# Patient Record
Sex: FEMALE | Race: ASIAN | Employment: PART TIME | URBAN - METROPOLITAN AREA
[De-identification: names, ages, dates, MRNs, and addresses within clinical notes are randomized per-mention and may not be internally consistent; named-entity substitution may affect disease eponyms.]

---

## 2020-01-17 PROBLEM — F41.1 GAD (GENERALIZED ANXIETY DISORDER): Status: ACTIVE | Noted: 2020-01-17

## 2020-01-17 PROBLEM — F50.81 BINGE EATING DISORDER: Status: ACTIVE | Noted: 2020-01-17

## 2020-01-17 PROBLEM — F33.41 RECURRENT MAJOR DEPRESSIVE DISORDER, IN PARTIAL REMISSION: Status: ACTIVE | Noted: 2020-01-17

## 2020-01-17 PROBLEM — F50.819 BINGE EATING DISORDER: Status: ACTIVE | Noted: 2020-01-17

## 2020-01-17 PROBLEM — F33.41 RECURRENT MAJOR DEPRESSIVE DISORDER, IN PARTIAL REMISSION (HCC): Status: ACTIVE | Noted: 2020-01-17

## 2021-03-20 ENCOUNTER — HOSPITAL ENCOUNTER (EMERGENCY)
Facility: HOSPITAL | Age: 26
Discharge: HOME | End: 2021-03-20
Attending: EMERGENCY MEDICINE
Payer: COMMERCIAL

## 2021-03-20 ENCOUNTER — APPOINTMENT (OUTPATIENT)
Dept: CT IMAGING | Facility: HOSPITAL | Age: 26
End: 2021-03-20
Attending: EMERGENCY MEDICINE
Payer: COMMERCIAL

## 2021-03-20 VITALS
RESPIRATION RATE: 17 BRPM | WEIGHT: 200 LBS | OXYGEN SATURATION: 97 % | SYSTOLIC BLOOD PRESSURE: 122 MMHG | HEIGHT: 64 IN | TEMPERATURE: 98 F | DIASTOLIC BLOOD PRESSURE: 61 MMHG | BODY MASS INDEX: 34.15 KG/M2 | HEART RATE: 91 BPM

## 2021-03-20 DIAGNOSIS — R35.0 URINARY FREQUENCY: Primary | ICD-10-CM

## 2021-03-20 LAB
ALBUMIN SERPL-MCNC: 3.2 G/DL (ref 3.4–5)
ALBUMIN/GLOB SERPL: 0.9 {RATIO} (ref 1–2)
ALP LIVER SERPL-CCNC: 65 U/L
ALT SERPL-CCNC: 23 U/L
ANION GAP SERPL CALC-SCNC: 4 MMOL/L (ref 0–18)
AST SERPL-CCNC: 12 U/L (ref 15–37)
BASOPHILS # BLD AUTO: 0.04 X10(3) UL (ref 0–0.2)
BASOPHILS NFR BLD AUTO: 0.5 %
BILIRUB SERPL-MCNC: 0.1 MG/DL (ref 0.1–2)
BILIRUB UR QL STRIP.AUTO: NEGATIVE
BUN BLD-MCNC: 11 MG/DL (ref 7–18)
BUN/CREAT SERPL: 11.5 (ref 10–20)
CALCIUM BLD-MCNC: 8.3 MG/DL (ref 8.5–10.1)
CHLORIDE SERPL-SCNC: 111 MMOL/L (ref 98–112)
CLARITY UR REFRACT.AUTO: CLEAR
CO2 SERPL-SCNC: 25 MMOL/L (ref 21–32)
CREAT BLD-MCNC: 0.96 MG/DL
DEPRECATED RDW RBC AUTO: 43.6 FL (ref 35.1–46.3)
EOSINOPHIL # BLD AUTO: 0.25 X10(3) UL (ref 0–0.7)
EOSINOPHIL NFR BLD AUTO: 3.1 %
ERYTHROCYTE [DISTWIDTH] IN BLOOD BY AUTOMATED COUNT: 12.7 % (ref 11–15)
ETHANOL SERPL-MCNC: <3 MG/DL (ref ?–3)
GLOBULIN PLAS-MCNC: 3.4 G/DL (ref 2.8–4.4)
GLUCOSE BLD-MCNC: 89 MG/DL (ref 70–99)
GLUCOSE UR STRIP.AUTO-MCNC: NEGATIVE MG/DL
HCT VFR BLD AUTO: 40.1 %
HGB BLD-MCNC: 13.5 G/DL
IMM GRANULOCYTES # BLD AUTO: 0.02 X10(3) UL (ref 0–1)
IMM GRANULOCYTES NFR BLD: 0.3 %
KETONES UR STRIP.AUTO-MCNC: NEGATIVE MG/DL
LEUKOCYTE ESTERASE UR QL STRIP.AUTO: NEGATIVE
LYMPHOCYTES # BLD AUTO: 3.34 X10(3) UL (ref 1–4)
LYMPHOCYTES NFR BLD AUTO: 41.9 %
M PROTEIN MFR SERPL ELPH: 6.6 G/DL (ref 6.4–8.2)
MCH RBC QN AUTO: 31.8 PG (ref 26–34)
MCHC RBC AUTO-ENTMCNC: 33.7 G/DL (ref 31–37)
MCV RBC AUTO: 94.6 FL
MONOCYTES # BLD AUTO: 0.84 X10(3) UL (ref 0.1–1)
MONOCYTES NFR BLD AUTO: 10.5 %
NEUTROPHILS # BLD AUTO: 3.49 X10 (3) UL (ref 1.5–7.7)
NEUTROPHILS # BLD AUTO: 3.49 X10(3) UL (ref 1.5–7.7)
NEUTROPHILS NFR BLD AUTO: 43.7 %
NITRITE UR QL STRIP.AUTO: NEGATIVE
OSMOLALITY SERPL CALC.SUM OF ELEC: 289 MOSM/KG (ref 275–295)
PH UR STRIP.AUTO: 8 [PH] (ref 5–8)
PLATELET # BLD AUTO: 323 10(3)UL (ref 150–450)
POCT URINE PREGNANCY: NEGATIVE
POTASSIUM SERPL-SCNC: 4.2 MMOL/L (ref 3.5–5.1)
PROT UR STRIP.AUTO-MCNC: NEGATIVE MG/DL
RBC # BLD AUTO: 4.24 X10(6)UL
RBC UR QL AUTO: NEGATIVE
SODIUM SERPL-SCNC: 140 MMOL/L (ref 136–145)
SP GR UR STRIP.AUTO: 1.01 (ref 1–1.03)
UROBILINOGEN UR STRIP.AUTO-MCNC: <2 MG/DL
WBC # BLD AUTO: 8 X10(3) UL (ref 4–11)

## 2021-03-20 PROCEDURE — 36415 COLL VENOUS BLD VENIPUNCTURE: CPT

## 2021-03-20 PROCEDURE — 81003 URINALYSIS AUTO W/O SCOPE: CPT | Performed by: EMERGENCY MEDICINE

## 2021-03-20 PROCEDURE — 85025 COMPLETE CBC W/AUTO DIFF WBC: CPT | Performed by: EMERGENCY MEDICINE

## 2021-03-20 PROCEDURE — 81025 URINE PREGNANCY TEST: CPT

## 2021-03-20 PROCEDURE — 74176 CT ABD & PELVIS W/O CONTRAST: CPT | Performed by: EMERGENCY MEDICINE

## 2021-03-20 PROCEDURE — 99284 EMERGENCY DEPT VISIT MOD MDM: CPT

## 2021-03-20 PROCEDURE — 80053 COMPREHEN METABOLIC PANEL: CPT | Performed by: EMERGENCY MEDICINE

## 2021-03-20 PROCEDURE — 82077 ASSAY SPEC XCP UR&BREATH IA: CPT | Performed by: EMERGENCY MEDICINE

## 2021-03-20 NOTE — ED NOTES
Patient discharged to home with follow-up with PCP and urology. Patient AOx3 with no signs of acute distress.

## 2021-03-20 NOTE — ED PROVIDER NOTES
Patient Seen in: [facility] Emergency Department      History   Patient presents with:  Flank Pain  Nausea/Vomiting/Diarrhea    Stated Complaint: Bilateral Flank Pain; Vomited 1x last night    HPI/Subjective:   HPI    27-year-old female presents rep 17   Ht 162.6 cm (5' 4\")   Wt 90.7 kg   LMP 02/28/2021   SpO2 97%   Breastfeeding No   BMI 34.33 kg/m²         Physical Exam    General:  Vitals as listed. No acute distress   HEENT: Sclerae anicteric. Conjunctivae show no pallor.   Oropharynx clear, muc Alejandrina Burden MD from [facility]         [facility]      21-year-old female presents reporting recent urinalysis showing microscopic hematuria with out evidence of infection. However, she was started on Keflex. Urine at the bedside is light yellow and clear.   [facility] [place]

## 2021-03-20 NOTE — ED INITIAL ASSESSMENT (HPI)
Pt states started peeing blood Tuesday. Pt states she was seen at immediate care. Pt was started on abx. Pt c/o pain to right flank and upper back pain.

## 2024-02-29 ENCOUNTER — HOSPITAL ENCOUNTER (EMERGENCY)
Facility: HOSPITAL | Age: 29
Discharge: HOME | End: 2024-02-29
Attending: EMERGENCY MEDICINE
Payer: MEDICAID

## 2024-02-29 ENCOUNTER — APPOINTMENT (OUTPATIENT)
Dept: ULTRASOUND IMAGING | Facility: HOSPITAL | Age: 29
End: 2024-02-29
Attending: EMERGENCY MEDICINE
Payer: MEDICAID

## 2024-02-29 ENCOUNTER — APPOINTMENT (OUTPATIENT)
Dept: CT IMAGING | Facility: HOSPITAL | Age: 29
End: 2024-02-29
Attending: EMERGENCY MEDICINE
Payer: MEDICAID

## 2024-02-29 VITALS
DIASTOLIC BLOOD PRESSURE: 76 MMHG | WEIGHT: 267 LBS | SYSTOLIC BLOOD PRESSURE: 129 MMHG | TEMPERATURE: 97 F | HEART RATE: 84 BPM | RESPIRATION RATE: 14 BRPM | OXYGEN SATURATION: 98 % | BODY MASS INDEX: 45.58 KG/M2 | HEIGHT: 64 IN

## 2024-02-29 DIAGNOSIS — N83.201 RIGHT OVARIAN CYST: Primary | ICD-10-CM

## 2024-02-29 LAB
ALBUMIN SERPL-MCNC: 3.5 G/DL (ref 3.4–5)
ALBUMIN/GLOB SERPL: 0.9 {RATIO} (ref 1–2)
ALP LIVER SERPL-CCNC: 86 U/L
ALT SERPL-CCNC: 21 U/L
ANION GAP SERPL CALC-SCNC: 2 MMOL/L (ref 0–18)
AST SERPL-CCNC: 23 U/L (ref 15–37)
B-HCG UR QL: NEGATIVE
BASOPHILS # BLD AUTO: 0.04 X10(3) UL (ref 0–0.2)
BASOPHILS NFR BLD AUTO: 0.3 %
BILIRUB SERPL-MCNC: 0.4 MG/DL (ref 0.1–2)
BILIRUB UR QL STRIP.AUTO: NEGATIVE
BUN BLD-MCNC: 14 MG/DL (ref 9–23)
CALCIUM BLD-MCNC: 9 MG/DL (ref 8.5–10.1)
CHLORIDE SERPL-SCNC: 111 MMOL/L (ref 98–112)
CLARITY UR REFRACT.AUTO: CLEAR
CO2 SERPL-SCNC: 23 MMOL/L (ref 21–32)
CREAT BLD-MCNC: 0.87 MG/DL
EGFRCR SERPLBLD CKD-EPI 2021: 94 ML/MIN/1.73M2 (ref 60–?)
EOSINOPHIL # BLD AUTO: 0.2 X10(3) UL (ref 0–0.7)
EOSINOPHIL NFR BLD AUTO: 1.6 %
ERYTHROCYTE [DISTWIDTH] IN BLOOD BY AUTOMATED COUNT: 13.4 %
GLOBULIN PLAS-MCNC: 3.7 G/DL (ref 2.8–4.4)
GLUCOSE BLD-MCNC: 116 MG/DL (ref 70–99)
GLUCOSE UR STRIP.AUTO-MCNC: NORMAL MG/DL
HCT VFR BLD AUTO: 43.6 %
HGB BLD-MCNC: 15 G/DL
IMM GRANULOCYTES # BLD AUTO: 0.08 X10(3) UL (ref 0–1)
IMM GRANULOCYTES NFR BLD: 0.6 %
LEUKOCYTE ESTERASE UR QL STRIP.AUTO: NEGATIVE
LIPASE SERPL-CCNC: 29 U/L (ref 13–75)
LYMPHOCYTES # BLD AUTO: 2.26 X10(3) UL (ref 1–4)
LYMPHOCYTES NFR BLD AUTO: 18.4 %
MCH RBC QN AUTO: 32.3 PG (ref 26–34)
MCHC RBC AUTO-ENTMCNC: 34.4 G/DL (ref 31–37)
MCV RBC AUTO: 94 FL
MONOCYTES # BLD AUTO: 0.93 X10(3) UL (ref 0.1–1)
MONOCYTES NFR BLD AUTO: 7.6 %
NEUTROPHILS # BLD AUTO: 8.8 X10 (3) UL (ref 1.5–7.7)
NEUTROPHILS # BLD AUTO: 8.8 X10(3) UL (ref 1.5–7.7)
NEUTROPHILS NFR BLD AUTO: 71.5 %
NITRITE UR QL STRIP.AUTO: NEGATIVE
OSMOLALITY SERPL CALC.SUM OF ELEC: 283 MOSM/KG (ref 275–295)
PH UR STRIP.AUTO: 6.5 [PH] (ref 5–8)
PLATELET # BLD AUTO: 304 10(3)UL (ref 150–450)
POTASSIUM SERPL-SCNC: 4.2 MMOL/L (ref 3.5–5.1)
PROT SERPL-MCNC: 7.2 G/DL (ref 6.4–8.2)
RBC # BLD AUTO: 4.64 X10(6)UL
SODIUM SERPL-SCNC: 136 MMOL/L (ref 136–145)
SP GR UR STRIP.AUTO: 1.03 (ref 1–1.03)
UROBILINOGEN UR STRIP.AUTO-MCNC: NORMAL MG/DL
WBC # BLD AUTO: 12.3 X10(3) UL (ref 4–11)

## 2024-02-29 PROCEDURE — 81001 URINALYSIS AUTO W/SCOPE: CPT | Performed by: EMERGENCY MEDICINE

## 2024-02-29 PROCEDURE — 76856 US EXAM PELVIC COMPLETE: CPT | Performed by: EMERGENCY MEDICINE

## 2024-02-29 PROCEDURE — 83690 ASSAY OF LIPASE: CPT

## 2024-02-29 PROCEDURE — 81025 URINE PREGNANCY TEST: CPT

## 2024-02-29 PROCEDURE — 85025 COMPLETE CBC W/AUTO DIFF WBC: CPT

## 2024-02-29 PROCEDURE — 83690 ASSAY OF LIPASE: CPT | Performed by: EMERGENCY MEDICINE

## 2024-02-29 PROCEDURE — 85025 COMPLETE CBC W/AUTO DIFF WBC: CPT | Performed by: EMERGENCY MEDICINE

## 2024-02-29 PROCEDURE — 80053 COMPREHEN METABOLIC PANEL: CPT

## 2024-02-29 PROCEDURE — 96374 THER/PROPH/DIAG INJ IV PUSH: CPT

## 2024-02-29 PROCEDURE — 74177 CT ABD & PELVIS W/CONTRAST: CPT | Performed by: EMERGENCY MEDICINE

## 2024-02-29 PROCEDURE — 99285 EMERGENCY DEPT VISIT HI MDM: CPT

## 2024-02-29 PROCEDURE — 80053 COMPREHEN METABOLIC PANEL: CPT | Performed by: EMERGENCY MEDICINE

## 2024-02-29 PROCEDURE — 93975 VASCULAR STUDY: CPT | Performed by: EMERGENCY MEDICINE

## 2024-02-29 RX ORDER — KETOROLAC TROMETHAMINE 15 MG/ML
15 INJECTION, SOLUTION INTRAMUSCULAR; INTRAVENOUS ONCE
Status: COMPLETED | OUTPATIENT
Start: 2024-02-29 | End: 2024-02-29

## 2024-02-29 RX ORDER — NAPROXEN 500 MG/1
500 TABLET ORAL 2 TIMES DAILY PRN
Qty: 20 TABLET | Refills: 0 | Status: SHIPPED | OUTPATIENT
Start: 2024-02-29 | End: 2024-03-10

## 2024-02-29 NOTE — ED PROVIDER NOTES
Patient Seen in: [facility] Emergency Department      History     Chief Complaint   Patient presents with    Abdomen/Flank Pain     Stated Complaint: generalized abd pain    Subjective:   HPI    28-year-old female with past medical history as below presents with right-sided abdominal pain that started at around noon today associated with nausea.  Patient states pain started radiating to her mid abdomen and sometimes her back.  She reports nausea and states he spit up in her mouth without actually vomiting.  She states she has been having chronic diarrhea for the past 2 months with approximately 5-10 episodes daily that is sometimes mucousy but nonbloody.  She states she is scheduled to see GI doctor regarding the diarrhea.  She denies having similar pain previously.  Denies urinary symptoms.  Denies fever or chills.  Denies cough or cold symptoms.    Objective:   Past Medical History:   Diagnosis Date    Anxiety     Binge eating disorder     Depression               History reviewed. No pertinent surgical history.             Social History     Socioeconomic History    Marital status: Single   Tobacco Use    Smoking status: Former    Smokeless tobacco: Never   Vaping Use    Vaping Use: Never used   Substance and Sexual Activity    Alcohol use: Not Currently     Comment: occ    Drug use: Yes     Types: Cannabis    Sexual activity: Yes     Partners: Male     Comment: boyfriend only              Review of Systems    Positive for stated complaint: generalized abd pain  Other systems are as noted in HPI.  Constitutional and vital signs reviewed.      All other systems reviewed and negative except as noted above.    Physical Exam     ED Triage Vitals   BP 02/29/24 1426 114/74   Pulse 02/29/24 1426 86   Resp 02/29/24 1426 18   Temp 02/29/24 1426 97 °F (36.1 °C)   Temp src --    SpO2 02/29/24 1426 95 %   O2 Device 02/29/24 1730 None (Room air)       Current:/74   Pulse 96   Temp 97 °F (36.1 °C)   Resp 16    Ht 162.6 cm (5' 4\")   Wt 121.1 kg   SpO2 98%   BMI 45.83 kg/m²         Physical Exam  Vitals and nursing note reviewed.   Constitutional:       Appearance: She is well-developed.   HENT:      Head: Normocephalic and atraumatic.      Mouth/Throat:      Mouth: Mucous membranes are moist.   Eyes:      General: No scleral icterus.  Cardiovascular:      Rate and Rhythm: Normal rate and regular rhythm.   Pulmonary:      Effort: Pulmonary effort is normal.      Breath sounds: Normal breath sounds.   Abdominal:      General: Bowel sounds are normal. There is no distension.      Palpations: Abdomen is soft.      Tenderness: There is abdominal tenderness. There is no guarding or rebound.      Comments: Diffuse right-sided abdominal tenderness   Skin:     General: Skin is warm and dry.   Neurological:      General: No focal deficit present.      Mental Status: She is alert and oriented to person, place, and time.      Cranial Nerves: No cranial nerve deficit.      Motor: No weakness.   Psychiatric:         Mood and Affect: Mood normal.         Behavior: Behavior normal.               ED Course     Labs Reviewed   COMP METABOLIC PANEL (14) - Abnormal; Notable for the following components:       Result Value    Glucose 116 (*)     A/G Ratio 0.9 (*)     All other components within normal limits   URINALYSIS, ROUTINE - Abnormal; Notable for the following components:    Ketones Urine Trace (*)     Blood Urine 1+ (*)     Protein Urine Trace (*)     Squamous Epi. Cells Few (*)     All other components within normal limits   CBC W/ DIFFERENTIAL - Abnormal; Notable for the following components:    WBC 12.3 (*)     Neutrophil Absolute Prelim 8.80 (*)     Neutrophil Absolute 8.80 (*)     All other components within normal limits   LIPASE - Normal   POCT PREGNANCY URINE - Normal   CBC WITH DIFFERENTIAL WITH PLATELET    Narrative:     The following orders were created for panel order CBC With Differential With Platelet.  Procedure                                Abnormality         Status                     ---------                               -----------         ------                     CBC W/ DIFFERENTIAL[[id number]]          Abnormal            Final result                 Please view results for these tests on the individual orders.   RAINBOW DRAW LAVENDER   RAINBOW DRAW LIGHT GREEN             US PELVIS W DOPPLER (IOF=[id number])    Result Date: 2/29/2024  CONCLUSION:  1. There is cyst of the right ovary measuring 4.1 cm.  This demonstrates low level internal echogenicity and no internal nodularity or Doppler signal consistent with a benign hemorrhagic cyst. 2. There is otherwise normal Doppler signal in the remainder of the right ovary. 3. The left ovary could not be visualized.   LOCATION:  [facility]    Dictated by (CST): Zac Henley MD on 2/29/2024 at 9:16 PM     Finalized by (CST): Zac Henley MD on 2/29/2024 at 9:18 PM       CT ABDOMEN+PELVIS(CONTRAST ONLY)(CPT=74177)    Result Date: 2/29/2024  CONCLUSION:  1. Fatty infiltration of the liver. 2. Right ovarian 4.5 cm complex cyst with trace amount of free pelvic fluid.  Recommend follow-up pelvic sonogram for further evaluation and to ensure resolution as clinically indicated.   LOCATION:  [facility]   Dictated by (CST): Monica Neal MD on 2/29/2024 at 6:52 PM     Finalized by (CST): Monica Neal MD on 2/29/2024 at 6:57 PM               MDM   28-year-old female with past medical history as below presents with right-sided abdominal pain that started at around noon today associated with nausea.     Differential includes but is not limited to appendicitis, cholecystitis, ureteral calculus, diverticulitis, ovarian cyst    Labs show mildly elevated WBC 12.3 without left shift.  Electrolytes and renal function are normal.  LFTs and lipase are normal.  UA without evidence of UTI.    Independent interpretation of CT abdomen/pelvis shows no evidence of ureteral calculus or appendicitis.  Right ovarian  4.5 cm complex cyst with trace amount of free fluid.  Ultrasound recommended for further evaluation.    Pelvic ultrasound obtained to further evaluate and rule out torsion.  Results reviewed as above noting 4.1 cm cyst with low internal echogenicity consistent with a benign hemorrhagic cyst.  There is normal Doppler flow to ovary.    Pain improved after Toradol.  Patient informed of findings and to follow-up with gynecology for further outpatient management.  Will DC home with Rx for naproxen.  Return precautions discussed.    Medical Decision Making  Amount and/or Complexity of Data Reviewed  Labs: ordered. Decision-making details documented in ED Course.  Radiology: ordered and independent interpretation performed. Decision-making details documented in ED Course.    Risk  Prescription drug management.        Disposition and Plan     Clinical Impression:  1. Right ovarian cyst         Disposition:  Discharge  2/29/2024  9:50 pm    Follow-up:  Joan Soria MD  [address] [place] [address]  [phone]    Schedule an appointment as soon as possible for a visit            Medications Prescribed:  Current Discharge Medication List        START taking these medications    Details   naproxen 500 MG Oral Tab Take 1 tablet (500 mg total) by mouth 2 (two) times daily as needed.  Qty: 20 tablet, Refills: 0

## 2024-02-29 NOTE — ED INITIAL ASSESSMENT (HPI)
A&Ox3 patient p/w acute onset RLQ pain radiating to umbilical area    Reports pain onset 2 hrs PTA, unable to have a BM \"I'll try to push and the pain shoots up\"    Reports diarrhea x2 mo    Denies any cp/sob/n/v/c/f/LH/vision changes/urinary sx at this time    RR even/NL, speaking in full clear sentences, ambulatory w/ steady gait

## 2024-03-01 NOTE — ED QUICK NOTES
Rounding Completed    Plan of Care reviewed. Waiting for CT results.  Patient denied needing more pain medication at this time.    Bed is locked and in lowest position. Call light within reach.

## 2025-02-23 ENCOUNTER — APPOINTMENT (OUTPATIENT)
Dept: CT IMAGING | Facility: HOSPITAL | Age: 30
End: 2025-02-23
Attending: EMERGENCY MEDICINE
Payer: MEDICAID

## 2025-02-23 ENCOUNTER — HOSPITAL ENCOUNTER (OUTPATIENT)
Facility: HOSPITAL | Age: 30
Setting detail: OBSERVATION
Discharge: HOME | End: 2025-02-24
Attending: EMERGENCY MEDICINE | Admitting: HOSPITALIST
Payer: MEDICAID

## 2025-02-23 ENCOUNTER — APPOINTMENT (OUTPATIENT)
Dept: ULTRASOUND IMAGING | Facility: HOSPITAL | Age: 30
End: 2025-02-23
Attending: EMERGENCY MEDICINE
Payer: MEDICAID

## 2025-02-23 ENCOUNTER — ANESTHESIA EVENT (OUTPATIENT)
Dept: SURGERY | Facility: HOSPITAL | Age: 30
End: 2025-02-23
Payer: MEDICAID

## 2025-02-23 ENCOUNTER — ANESTHESIA (OUTPATIENT)
Dept: SURGERY | Facility: HOSPITAL | Age: 30
End: 2025-02-23
Payer: MEDICAID

## 2025-02-23 DIAGNOSIS — R10.9 ABDOMINAL PAIN, ACUTE: Primary | ICD-10-CM

## 2025-02-23 DIAGNOSIS — K81.0 ACUTE CHOLECYSTITIS: ICD-10-CM

## 2025-02-23 PROBLEM — D72.829 LEUKOCYTOSIS: Status: ACTIVE | Noted: 2025-02-23

## 2025-02-23 PROBLEM — E87.20 METABOLIC ACIDOSIS: Status: ACTIVE | Noted: 2025-02-23

## 2025-02-23 LAB
ALBUMIN SERPL-MCNC: 4.8 G/DL (ref 3.2–4.8)
ALBUMIN/GLOB SERPL: 1.6 {RATIO} (ref 1–2)
ALP LIVER SERPL-CCNC: 73 U/L
ALT SERPL-CCNC: 20 U/L
ANION GAP SERPL CALC-SCNC: 14 MMOL/L (ref 0–18)
AST SERPL-CCNC: 21 U/L (ref ?–34)
ATRIAL RATE: 81 BPM
B-HCG UR QL: NEGATIVE
BASOPHILS # BLD AUTO: 0.05 X10(3) UL (ref 0–0.2)
BASOPHILS NFR BLD AUTO: 0.2 %
BILIRUB SERPL-MCNC: 0.5 MG/DL (ref 0.3–1.2)
BILIRUB UR QL STRIP.AUTO: NEGATIVE
BUN BLD-MCNC: 13 MG/DL (ref 9–23)
CALCIUM BLD-MCNC: 9.3 MG/DL (ref 8.7–10.6)
CHLORIDE SERPL-SCNC: 105 MMOL/L (ref 98–112)
CO2 SERPL-SCNC: 18 MMOL/L (ref 21–32)
COLOR UR AUTO: YELLOW
CREAT BLD-MCNC: 0.76 MG/DL
EGFRCR SERPLBLD CKD-EPI 2021: 109 ML/MIN/1.73M2 (ref 60–?)
EOSINOPHIL # BLD AUTO: 0.13 X10(3) UL (ref 0–0.7)
EOSINOPHIL NFR BLD AUTO: 0.6 %
ERYTHROCYTE [DISTWIDTH] IN BLOOD BY AUTOMATED COUNT: 13.3 %
GLOBULIN PLAS-MCNC: 3 G/DL (ref 2–3.5)
GLUCOSE BLD-MCNC: 136 MG/DL (ref 70–99)
GLUCOSE UR STRIP.AUTO-MCNC: NORMAL MG/DL
HCT VFR BLD AUTO: 49 %
HGB BLD-MCNC: 16.4 G/DL
IMM GRANULOCYTES # BLD AUTO: 0.13 X10(3) UL (ref 0–1)
IMM GRANULOCYTES NFR BLD: 0.6 %
KETONES UR STRIP.AUTO-MCNC: 20 MG/DL
LEUKOCYTE ESTERASE UR QL STRIP.AUTO: NEGATIVE
LIPASE SERPL-CCNC: 40 U/L (ref 12–53)
LYMPHOCYTES # BLD AUTO: 1.66 X10(3) UL (ref 1–4)
LYMPHOCYTES NFR BLD AUTO: 7.7 %
MCH RBC QN AUTO: 31.2 PG (ref 26–34)
MCHC RBC AUTO-ENTMCNC: 33.5 G/DL (ref 31–37)
MCV RBC AUTO: 93.3 FL
MONOCYTES # BLD AUTO: 0.92 X10(3) UL (ref 0.1–1)
MONOCYTES NFR BLD AUTO: 4.3 %
NEUTROPHILS # BLD AUTO: 18.68 X10 (3) UL (ref 1.5–7.7)
NEUTROPHILS # BLD AUTO: 18.68 X10(3) UL (ref 1.5–7.7)
NEUTROPHILS NFR BLD AUTO: 86.6 %
NITRITE UR QL STRIP.AUTO: NEGATIVE
OSMOLALITY SERPL CALC.SUM OF ELEC: 286 MOSM/KG (ref 275–295)
P-R INTERVAL: 144 MS
PH UR STRIP.AUTO: 6 [PH] (ref 5–8)
PLATELET # BLD AUTO: 347 10(3)UL (ref 150–450)
POTASSIUM SERPL-SCNC: 4.6 MMOL/L (ref 3.5–5.1)
PROT SERPL-MCNC: 7.8 G/DL (ref 5.7–8.2)
PROT UR STRIP.AUTO-MCNC: 30 MG/DL
Q-T INTERVAL: 392 MS
QRS DURATION: 78 MS
QTC CALCULATION (BEZET): 455 MS
R AXIS: 115 DEGREES
RBC # BLD AUTO: 5.25 X10(6)UL
SODIUM SERPL-SCNC: 137 MMOL/L (ref 136–145)
SP GR UR STRIP.AUTO: >1.03 (ref 1–1.03)
T AXIS: -40 DEGREES
UROBILINOGEN UR STRIP.AUTO-MCNC: NORMAL MG/DL
VENTRICULAR RATE: 81 BPM
WBC # BLD AUTO: 21.6 X10(3) UL (ref 4–11)

## 2025-02-23 PROCEDURE — 0FT44ZZ RESECTION OF GALLBLADDER, PERCUTANEOUS ENDOSCOPIC APPROACH: ICD-10-PCS | Performed by: STUDENT IN AN ORGANIZED HEALTH CARE EDUCATION/TRAINING PROGRAM

## 2025-02-23 PROCEDURE — 47562 LAPAROSCOPIC CHOLECYSTECTOMY: CPT | Performed by: STUDENT IN AN ORGANIZED HEALTH CARE EDUCATION/TRAINING PROGRAM

## 2025-02-23 PROCEDURE — S2900 ROBOTIC SURGICAL SYSTEM: HCPCS | Performed by: STUDENT IN AN ORGANIZED HEALTH CARE EDUCATION/TRAINING PROGRAM

## 2025-02-23 PROCEDURE — 74177 CT ABD & PELVIS W/CONTRAST: CPT | Performed by: EMERGENCY MEDICINE

## 2025-02-23 PROCEDURE — 8E0W4CZ ROBOTIC ASSISTED PROCEDURE OF TRUNK REGION, PERCUTANEOUS ENDOSCOPIC APPROACH: ICD-10-PCS | Performed by: STUDENT IN AN ORGANIZED HEALTH CARE EDUCATION/TRAINING PROGRAM

## 2025-02-23 PROCEDURE — 76700 US EXAM ABDOM COMPLETE: CPT | Performed by: EMERGENCY MEDICINE

## 2025-02-23 PROCEDURE — 99253 IP/OBS CNSLTJ NEW/EST LOW 45: CPT | Performed by: STUDENT IN AN ORGANIZED HEALTH CARE EDUCATION/TRAINING PROGRAM

## 2025-02-23 RX ORDER — ACETAMINOPHEN 325 MG/1
650 TABLET ORAL EVERY 6 HOURS PRN
COMMUNITY

## 2025-02-23 RX ORDER — HYDROMORPHONE HYDROCHLORIDE 1 MG/ML
0.8 INJECTION, SOLUTION INTRAMUSCULAR; INTRAVENOUS; SUBCUTANEOUS EVERY 2 HOUR PRN
Status: DISCONTINUED | OUTPATIENT
Start: 2025-02-23 | End: 2025-02-24

## 2025-02-23 RX ORDER — ONDANSETRON 2 MG/ML
4 INJECTION INTRAMUSCULAR; INTRAVENOUS ONCE
Status: COMPLETED | OUTPATIENT
Start: 2025-02-23 | End: 2025-02-23

## 2025-02-23 RX ORDER — LISDEXAMFETAMINE DIMESYLATE 30 MG/1
30 CAPSULE ORAL EVERY MORNING
COMMUNITY
Start: 2025-02-08

## 2025-02-23 RX ORDER — DIPHENHYDRAMINE HYDROCHLORIDE 50 MG/ML
12.5 INJECTION INTRAMUSCULAR; INTRAVENOUS AS NEEDED
Status: DISCONTINUED | OUTPATIENT
Start: 2025-02-23 | End: 2025-02-23 | Stop reason: HOSPADM

## 2025-02-23 RX ORDER — ONDANSETRON 2 MG/ML
4 INJECTION INTRAMUSCULAR; INTRAVENOUS EVERY 6 HOURS PRN
Status: DISCONTINUED | OUTPATIENT
Start: 2025-02-23 | End: 2025-02-23

## 2025-02-23 RX ORDER — SENNOSIDES 8.6 MG
17.2 TABLET ORAL NIGHTLY PRN
Status: DISCONTINUED | OUTPATIENT
Start: 2025-02-23 | End: 2025-02-23

## 2025-02-23 RX ORDER — MORPHINE SULFATE 4 MG/ML
1 INJECTION, SOLUTION INTRAMUSCULAR; INTRAVENOUS EVERY 2 HOUR PRN
Status: DISCONTINUED | OUTPATIENT
Start: 2025-02-23 | End: 2025-02-24

## 2025-02-23 RX ORDER — ROCURONIUM BROMIDE 10 MG/ML
INJECTION, SOLUTION INTRAVENOUS AS NEEDED
Status: DISCONTINUED | OUTPATIENT
Start: 2025-02-23 | End: 2025-02-23 | Stop reason: SURG

## 2025-02-23 RX ORDER — DEXTROAMPHETAMINE SACCHARATE, AMPHETAMINE ASPARTATE, DEXTROAMPHETAMINE SULFATE AND AMPHETAMINE SULFATE 1.25; 1.25; 1.25; 1.25 MG/1; MG/1; MG/1; MG/1
5 TABLET ORAL
Status: DISCONTINUED | OUTPATIENT
Start: 2025-02-24 | End: 2025-02-24

## 2025-02-23 RX ORDER — ACETAMINOPHEN 500 MG
500 TABLET ORAL EVERY 4 HOURS PRN
Status: DISCONTINUED | OUTPATIENT
Start: 2025-02-23 | End: 2025-02-23

## 2025-02-23 RX ORDER — ONDANSETRON 2 MG/ML
INJECTION INTRAMUSCULAR; INTRAVENOUS AS NEEDED
Status: DISCONTINUED | OUTPATIENT
Start: 2025-02-23 | End: 2025-02-23 | Stop reason: SURG

## 2025-02-23 RX ORDER — KETOROLAC TROMETHAMINE 30 MG/ML
INJECTION, SOLUTION INTRAMUSCULAR; INTRAVENOUS AS NEEDED
Status: DISCONTINUED | OUTPATIENT
Start: 2025-02-23 | End: 2025-02-23 | Stop reason: SURG

## 2025-02-23 RX ORDER — SODIUM CHLORIDE 9 MG/ML
INJECTION, SOLUTION INTRAVENOUS CONTINUOUS
Status: ACTIVE | OUTPATIENT
Start: 2025-02-23 | End: 2025-02-23

## 2025-02-23 RX ORDER — SODIUM CHLORIDE, SODIUM LACTATE, POTASSIUM CHLORIDE, CALCIUM CHLORIDE 600; 310; 30; 20 MG/100ML; MG/100ML; MG/100ML; MG/100ML
INJECTION, SOLUTION INTRAVENOUS CONTINUOUS
Status: DISCONTINUED | OUTPATIENT
Start: 2025-02-23 | End: 2025-02-23 | Stop reason: HOSPADM

## 2025-02-23 RX ORDER — MORPHINE SULFATE 4 MG/ML
4 INJECTION, SOLUTION INTRAMUSCULAR; INTRAVENOUS EVERY 2 HOUR PRN
Status: DISCONTINUED | OUTPATIENT
Start: 2025-02-23 | End: 2025-02-24

## 2025-02-23 RX ORDER — POLYETHYLENE GLYCOL 3350 17 G/17G
17 POWDER, FOR SOLUTION ORAL DAILY PRN
Status: DISCONTINUED | OUTPATIENT
Start: 2025-02-23 | End: 2025-02-24

## 2025-02-23 RX ORDER — LIDOCAINE HYDROCHLORIDE 10 MG/ML
INJECTION, SOLUTION EPIDURAL; INFILTRATION; INTRACAUDAL; PERINEURAL AS NEEDED
Status: DISCONTINUED | OUTPATIENT
Start: 2025-02-23 | End: 2025-02-23 | Stop reason: SURG

## 2025-02-23 RX ORDER — CEFOXITIN 2 G/1
INJECTION, POWDER, FOR SOLUTION INTRAVENOUS AS NEEDED
Status: DISCONTINUED | OUTPATIENT
Start: 2025-02-23 | End: 2025-02-23 | Stop reason: SURG

## 2025-02-23 RX ORDER — DEXAMETHASONE SODIUM PHOSPHATE 4 MG/ML
VIAL (ML) INJECTION AS NEEDED
Status: DISCONTINUED | OUTPATIENT
Start: 2025-02-23 | End: 2025-02-23 | Stop reason: SURG

## 2025-02-23 RX ORDER — ACETAMINOPHEN 500 MG
1000 TABLET ORAL EVERY 8 HOURS SCHEDULED
Status: DISCONTINUED | OUTPATIENT
Start: 2025-02-23 | End: 2025-02-24

## 2025-02-23 RX ORDER — ALBUTEROL SULFATE 90 UG/1
2 INHALANT RESPIRATORY (INHALATION) AS NEEDED
Status: DISCONTINUED | OUTPATIENT
Start: 2025-02-23 | End: 2025-02-23 | Stop reason: DRUGHIGH

## 2025-02-23 RX ORDER — HYDROMORPHONE HYDROCHLORIDE 1 MG/ML
0.5 INJECTION, SOLUTION INTRAMUSCULAR; INTRAVENOUS; SUBCUTANEOUS ONCE
Status: COMPLETED | OUTPATIENT
Start: 2025-02-23 | End: 2025-02-23

## 2025-02-23 RX ORDER — KETOROLAC TROMETHAMINE 30 MG/ML
30 INJECTION, SOLUTION INTRAMUSCULAR; INTRAVENOUS EVERY 6 HOURS
Status: DISCONTINUED | OUTPATIENT
Start: 2025-02-23 | End: 2025-02-24

## 2025-02-23 RX ORDER — ACETAMINOPHEN 500 MG
1000 TABLET ORAL ONCE AS NEEDED
Status: DISCONTINUED | OUTPATIENT
Start: 2025-02-23 | End: 2025-02-23 | Stop reason: HOSPADM

## 2025-02-23 RX ORDER — POLYETHYLENE GLYCOL 3350 17 G/17G
17 POWDER, FOR SOLUTION ORAL DAILY PRN
Status: DISCONTINUED | OUTPATIENT
Start: 2025-02-23 | End: 2025-02-23

## 2025-02-23 RX ORDER — MIDAZOLAM HYDROCHLORIDE 1 MG/ML
1 INJECTION INTRAMUSCULAR; INTRAVENOUS EVERY 5 MIN PRN
Status: DISCONTINUED | OUTPATIENT
Start: 2025-02-23 | End: 2025-02-23 | Stop reason: HOSPADM

## 2025-02-23 RX ORDER — HYDROCODONE BITARTRATE AND ACETAMINOPHEN 5; 325 MG/1; MG/1
2 TABLET ORAL ONCE AS NEEDED
Status: DISCONTINUED | OUTPATIENT
Start: 2025-02-23 | End: 2025-02-23 | Stop reason: HOSPADM

## 2025-02-23 RX ORDER — BUPROPION HYDROCHLORIDE 150 MG/1
150 TABLET ORAL EVERY MORNING
Status: DISCONTINUED | OUTPATIENT
Start: 2025-02-24 | End: 2025-02-23

## 2025-02-23 RX ORDER — ACETAMINOPHEN 500 MG
500 TABLET ORAL EVERY 4 HOURS PRN
Status: DISCONTINUED | OUTPATIENT
Start: 2025-02-23 | End: 2025-02-24

## 2025-02-23 RX ORDER — ONDANSETRON 2 MG/ML
4 INJECTION INTRAMUSCULAR; INTRAVENOUS EVERY 6 HOURS PRN
Status: DISCONTINUED | OUTPATIENT
Start: 2025-02-23 | End: 2025-02-23 | Stop reason: HOSPADM

## 2025-02-23 RX ORDER — ALBUTEROL SULFATE 90 UG/1
2 INHALANT RESPIRATORY (INHALATION) EVERY 4 HOURS PRN
Status: DISCONTINUED | OUTPATIENT
Start: 2025-02-23 | End: 2025-02-24

## 2025-02-23 RX ORDER — SODIUM CHLORIDE 9 MG/ML
INJECTION, SOLUTION INTRAVENOUS CONTINUOUS
Status: DISCONTINUED | OUTPATIENT
Start: 2025-02-23 | End: 2025-02-24

## 2025-02-23 RX ORDER — KETOROLAC TROMETHAMINE 15 MG/ML
15 INJECTION, SOLUTION INTRAMUSCULAR; INTRAVENOUS ONCE
Status: COMPLETED | OUTPATIENT
Start: 2025-02-23 | End: 2025-02-23

## 2025-02-23 RX ORDER — OXYCODONE HYDROCHLORIDE 10 MG/1
10 TABLET ORAL EVERY 4 HOURS PRN
Status: DISCONTINUED | OUTPATIENT
Start: 2025-02-23 | End: 2025-02-24

## 2025-02-23 RX ORDER — MIDAZOLAM HYDROCHLORIDE 1 MG/ML
INJECTION INTRAMUSCULAR; INTRAVENOUS AS NEEDED
Status: DISCONTINUED | OUTPATIENT
Start: 2025-02-23 | End: 2025-02-23 | Stop reason: SURG

## 2025-02-23 RX ORDER — BISACODYL 10 MG
10 SUPPOSITORY, RECTAL RECTAL
Status: DISCONTINUED | OUTPATIENT
Start: 2025-02-23 | End: 2025-02-23

## 2025-02-23 RX ORDER — SODIUM CHLORIDE 9 MG/ML
INJECTION, SOLUTION INTRAVENOUS CONTINUOUS
Status: DISCONTINUED | OUTPATIENT
Start: 2025-02-23 | End: 2025-02-23

## 2025-02-23 RX ORDER — ONDANSETRON 2 MG/ML
4 INJECTION INTRAMUSCULAR; INTRAVENOUS EVERY 6 HOURS PRN
Status: DISCONTINUED | OUTPATIENT
Start: 2025-02-23 | End: 2025-02-24

## 2025-02-23 RX ORDER — HYDROMORPHONE HYDROCHLORIDE 1 MG/ML
0.2 INJECTION, SOLUTION INTRAMUSCULAR; INTRAVENOUS; SUBCUTANEOUS EVERY 5 MIN PRN
Status: DISCONTINUED | OUTPATIENT
Start: 2025-02-23 | End: 2025-02-23 | Stop reason: HOSPADM

## 2025-02-23 RX ORDER — MEPERIDINE HYDROCHLORIDE 25 MG/ML
12.5 INJECTION INTRAMUSCULAR; INTRAVENOUS; SUBCUTANEOUS AS NEEDED
Status: DISCONTINUED | OUTPATIENT
Start: 2025-02-23 | End: 2025-02-23 | Stop reason: HOSPADM

## 2025-02-23 RX ORDER — ONDANSETRON 2 MG/ML
4 INJECTION INTRAMUSCULAR; INTRAVENOUS EVERY 4 HOURS PRN
Status: DISCONTINUED | OUTPATIENT
Start: 2025-02-23 | End: 2025-02-23

## 2025-02-23 RX ORDER — MORPHINE SULFATE 4 MG/ML
2 INJECTION, SOLUTION INTRAMUSCULAR; INTRAVENOUS EVERY 2 HOUR PRN
Status: DISCONTINUED | OUTPATIENT
Start: 2025-02-23 | End: 2025-02-24

## 2025-02-23 RX ORDER — LABETALOL HYDROCHLORIDE 5 MG/ML
5 INJECTION, SOLUTION INTRAVENOUS EVERY 5 MIN PRN
Status: DISCONTINUED | OUTPATIENT
Start: 2025-02-23 | End: 2025-02-23 | Stop reason: HOSPADM

## 2025-02-23 RX ORDER — ONDANSETRON 2 MG/ML
4 INJECTION INTRAMUSCULAR; INTRAVENOUS ONCE
Status: DISCONTINUED | OUTPATIENT
Start: 2025-02-23 | End: 2025-02-23

## 2025-02-23 RX ORDER — HYDROMORPHONE HYDROCHLORIDE 1 MG/ML
0.5 INJECTION, SOLUTION INTRAMUSCULAR; INTRAVENOUS; SUBCUTANEOUS EVERY 30 MIN PRN
Status: DISCONTINUED | OUTPATIENT
Start: 2025-02-23 | End: 2025-02-23

## 2025-02-23 RX ORDER — SENNOSIDES 8.6 MG
17.2 TABLET ORAL NIGHTLY PRN
Status: DISCONTINUED | OUTPATIENT
Start: 2025-02-23 | End: 2025-02-24

## 2025-02-23 RX ORDER — HYDROMORPHONE HYDROCHLORIDE 1 MG/ML
0.6 INJECTION, SOLUTION INTRAMUSCULAR; INTRAVENOUS; SUBCUTANEOUS EVERY 5 MIN PRN
Status: DISCONTINUED | OUTPATIENT
Start: 2025-02-23 | End: 2025-02-23 | Stop reason: HOSPADM

## 2025-02-23 RX ORDER — HYDROMORPHONE HYDROCHLORIDE 1 MG/ML
0.4 INJECTION, SOLUTION INTRAMUSCULAR; INTRAVENOUS; SUBCUTANEOUS EVERY 5 MIN PRN
Status: DISCONTINUED | OUTPATIENT
Start: 2025-02-23 | End: 2025-02-23 | Stop reason: HOSPADM

## 2025-02-23 RX ORDER — OXYCODONE HYDROCHLORIDE 5 MG/1
5 TABLET ORAL EVERY 4 HOURS PRN
Status: DISCONTINUED | OUTPATIENT
Start: 2025-02-23 | End: 2025-02-24

## 2025-02-23 RX ORDER — PROCHLORPERAZINE EDISYLATE 5 MG/ML
5 INJECTION INTRAMUSCULAR; INTRAVENOUS EVERY 8 HOURS PRN
Status: DISCONTINUED | OUTPATIENT
Start: 2025-02-23 | End: 2025-02-23

## 2025-02-23 RX ORDER — PROCHLORPERAZINE EDISYLATE 5 MG/ML
5 INJECTION INTRAMUSCULAR; INTRAVENOUS EVERY 8 HOURS PRN
Status: DISCONTINUED | OUTPATIENT
Start: 2025-02-23 | End: 2025-02-23 | Stop reason: HOSPADM

## 2025-02-23 RX ORDER — HYDROMORPHONE HYDROCHLORIDE 1 MG/ML
0.4 INJECTION, SOLUTION INTRAMUSCULAR; INTRAVENOUS; SUBCUTANEOUS EVERY 2 HOUR PRN
Status: DISCONTINUED | OUTPATIENT
Start: 2025-02-23 | End: 2025-02-24

## 2025-02-23 RX ORDER — CYCLOBENZAPRINE HCL 10 MG
10 TABLET ORAL 3 TIMES DAILY PRN
COMMUNITY
Start: 2024-12-10

## 2025-02-23 RX ORDER — SODIUM CHLORIDE, SODIUM LACTATE, POTASSIUM CHLORIDE, CALCIUM CHLORIDE 600; 310; 30; 20 MG/100ML; MG/100ML; MG/100ML; MG/100ML
INJECTION, SOLUTION INTRAVENOUS CONTINUOUS PRN
Status: DISCONTINUED | OUTPATIENT
Start: 2025-02-23 | End: 2025-02-23 | Stop reason: SURG

## 2025-02-23 RX ORDER — NALOXONE HYDROCHLORIDE 0.4 MG/ML
0.08 INJECTION, SOLUTION INTRAMUSCULAR; INTRAVENOUS; SUBCUTANEOUS AS NEEDED
Status: DISCONTINUED | OUTPATIENT
Start: 2025-02-23 | End: 2025-02-23 | Stop reason: HOSPADM

## 2025-02-23 RX ORDER — ALBUTEROL SULFATE 0.83 MG/ML
2.5 SOLUTION RESPIRATORY (INHALATION) AS NEEDED
Status: DISCONTINUED | OUTPATIENT
Start: 2025-02-23 | End: 2025-02-23 | Stop reason: HOSPADM

## 2025-02-23 RX ORDER — PROCHLORPERAZINE EDISYLATE 5 MG/ML
5 INJECTION INTRAMUSCULAR; INTRAVENOUS EVERY 8 HOURS PRN
Status: DISCONTINUED | OUTPATIENT
Start: 2025-02-23 | End: 2025-02-24

## 2025-02-23 RX ORDER — HYDROCODONE BITARTRATE AND ACETAMINOPHEN 5; 325 MG/1; MG/1
1 TABLET ORAL ONCE AS NEEDED
Status: DISCONTINUED | OUTPATIENT
Start: 2025-02-23 | End: 2025-02-23 | Stop reason: HOSPADM

## 2025-02-23 RX ORDER — HYDROMORPHONE HYDROCHLORIDE 1 MG/ML
1 INJECTION, SOLUTION INTRAMUSCULAR; INTRAVENOUS; SUBCUTANEOUS ONCE
Status: COMPLETED | OUTPATIENT
Start: 2025-02-23 | End: 2025-02-23

## 2025-02-23 RX ORDER — BISACODYL 10 MG
10 SUPPOSITORY, RECTAL RECTAL
Status: DISCONTINUED | OUTPATIENT
Start: 2025-02-23 | End: 2025-02-24

## 2025-02-23 RX ORDER — ALBUTEROL SULFATE 90 UG/1
2 INHALANT RESPIRATORY (INHALATION) AS NEEDED
COMMUNITY
Start: 2025-01-29

## 2025-02-23 RX ORDER — INDOCYANINE GREEN AND WATER 25 MG
5 KIT INJECTION ONCE
Status: COMPLETED | OUTPATIENT
Start: 2025-02-23 | End: 2025-02-23

## 2025-02-23 RX ORDER — SODIUM PHOSPHATE, DIBASIC AND SODIUM PHOSPHATE, MONOBASIC 7; 19 G/230ML; G/230ML
1 ENEMA RECTAL ONCE AS NEEDED
Status: DISCONTINUED | OUTPATIENT
Start: 2025-02-23 | End: 2025-02-24

## 2025-02-23 RX ADMIN — DEXAMETHASONE SODIUM PHOSPHATE 4 MG: 4 MG/ML VIAL (ML) INJECTION at 19:15:00

## 2025-02-23 RX ADMIN — CEFOXITIN 2 G: 2 INJECTION, POWDER, FOR SOLUTION INTRAVENOUS at 18:38:00

## 2025-02-23 RX ADMIN — ROCURONIUM BROMIDE 50 MG: 10 INJECTION, SOLUTION INTRAVENOUS at 18:30:00

## 2025-02-23 RX ADMIN — ONDANSETRON 4 MG: 2 INJECTION INTRAMUSCULAR; INTRAVENOUS at 19:56:00

## 2025-02-23 RX ADMIN — ROCURONIUM BROMIDE 20 MG: 10 INJECTION, SOLUTION INTRAVENOUS at 19:00:00

## 2025-02-23 RX ADMIN — SODIUM CHLORIDE, SODIUM LACTATE, POTASSIUM CHLORIDE, CALCIUM CHLORIDE: 600; 310; 30; 20 INJECTION, SOLUTION INTRAVENOUS at 18:19:00

## 2025-02-23 RX ADMIN — MIDAZOLAM HYDROCHLORIDE 2 MG: 1 INJECTION INTRAMUSCULAR; INTRAVENOUS at 18:19:00

## 2025-02-23 RX ADMIN — ROCURONIUM BROMIDE 20 MG: 10 INJECTION, SOLUTION INTRAVENOUS at 19:27:00

## 2025-02-23 RX ADMIN — KETOROLAC TROMETHAMINE 30 MG: 30 INJECTION, SOLUTION INTRAMUSCULAR; INTRAVENOUS at 20:01:00

## 2025-02-23 RX ADMIN — LIDOCAINE HYDROCHLORIDE 25 MG: 10 INJECTION, SOLUTION EPIDURAL; INFILTRATION; INTRACAUDAL; PERINEURAL at 18:29:00

## 2025-02-23 RX ADMIN — SODIUM CHLORIDE, SODIUM LACTATE, POTASSIUM CHLORIDE, CALCIUM CHLORIDE: 600; 310; 30; 20 INJECTION, SOLUTION INTRAVENOUS at 20:20:00

## 2025-02-23 NOTE — H&P
[facility]     Lise Ahumada Patient Status:  Emergency    3/27/1995 MRN [id number]   Location [facility] EMERGENCY DEPARTMENT Attending Nhan Calvert MD   Hosp Day # 0 PCP PHYSICIAN NONSTAFF     Chief Complaint: Nausea vomiting abdominal pain    Subjective:    History of Present Illness:     Lise Ahumada is a 29 year old female with history of depression and anxiety and PTSD and eating disorder morbid obesity BMI 40 presents to emergency room with abdominal pain that started last night.  Patient states that she did not eat out.  She denies fever chills hematemesis melena hemoptysis.  She describes the pain localized in the right upper quadrant radiating to the right CVA area as well as to the epigastric area 7 8 out of 10 dull in nature and persistent.    History/Other:    Past Medical History:  Past Medical History:    Anxiety    Binge eating disorder    Depression     Past Surgical History:   History reviewed. No pertinent surgical history.   Family History:   No family history on file.  Social History:    reports that she has quit smoking. She has never used smokeless tobacco. She reports that she does not currently use alcohol. She reports current drug use. Drug: Cannabis.     Allergies: Allergies[1]    Medications:  Medications Ordered Prior to Encounter[2]    Review of Systems:   A comprehensive review of systems was completed.    Pertinent positives and negatives noted in the HPI.    Objective:   Physical Exam:    /72   Pulse 85   Temp 97 °F (36.1 °C) (Temporal)   Resp 21   Ht 5' 4\" (1.626 m)   Wt 230 lb (104.3 kg)   LMP 10/24/2024   SpO2 98%   BMI 39.48 kg/m²   General: No acute distress, Alert  Respiratory: No rhonchi, no wheezes  Cardiovascular: S1, S2. Regular rate and rhythm  Abdomen: Soft, ruq tender, non-distended, positive bowel sounds  Neuro: No new focal deficits  Extremities: No edema      Results:    Labs:      Labs Last 24 Hours:    Recent Labs    Lab 02/23/25  1059   RBC 5.25   HGB 16.4*   HCT 49.0*   MCV 93.3   MCH 31.2   MCHC 33.5   RDW 13.3   NEPRELIM 18.68*   WBC 21.6*   .0       Recent Labs   Lab 02/23/25  1059 02/23/25  1225   *  --    BUN 13  --    CREATSERUM 0.76  --    EGFRCR 109  --    CA 9.3  --    ALB 4.8  --      --    K  --  4.6     --    CO2 18.0*  --    ALKPHO 73  --    AST  --  21   ALT  --  20   BILT 0.5  --    TP 7.8  --        Estimated Glomerular Filtration Rate: 109.6 mL/min/1.73m2 (by CKD-EPI based on SCr of 0.76 mg/dL).    No results found for: \"PT\", \"INR\"    No results for input(s): \"TROP\", \"TROPHS\", \"CK\" in the last 168 hours.    No results for input(s): \"TROP\", \"PBNP\" in the last 168 hours.    No results for input(s): \"PCT\" in the last 168 hours.    Imaging: Imaging data reviewed in Epic.    Assessment & Plan:      # 29 years old with hepatic steatosis left nephrolithiasis liver lesions consistent with hemangiomas presents with abdominal pain suggestive of acute cholecystitis  -WBC 21.6 with left shift  -Dehydration and metabolic acidosis    #hx of anxiety and depression          Plan of care discussed with patient and ED physician    Whitney Ramírez MD    Supplementary Documentation:     The 21st Century Cures Act makes medical notes like these available to patients in the interest of transparency. Please be advised this is a medical document. Medical documents are intended to carry relevant information, facts as evident, and the clinical opinion of the practitioner. The medical note is intended as peer to peer communication and may appear blunt or direct. It is written in medical language and may contain abbreviations or verbiage that are unfamiliar.                                       [1]   Allergies  Allergen Reactions    Chlorhexidine HIVES   [2]   No current facility-administered medications on file prior to encounter.     Current Outpatient Medications on File Prior to Encounter   Medication Sig  Dispense Refill    CEPHALEXIN OR Take by mouth.      NORTREL 1/35, 28, 1-35 MG-MCG Oral Tab TAKE 1 TABLET BY MOUTH DAILY 84 tablet 0    Dextroamphetamine Sulfate 10 MG Oral Tab TK 1 TO 2 TS PO D IN THE AFTERNOON      buPROPion HCl ER, XL, 150 MG Oral Tablet 24 Hr TK 1 T PO QAM      LORazepam 2 MG Oral Tab       VYVANSE 70 MG Oral Cap TK 1 C QAM (Patient not taking: Reported on 3/20/2021)      Fluticasone Propionate 50 MCG/ACT Nasal Suspension U 2 SPRAYS IEN QAM 1 Bottle 11    Clobetasol Propionate 0.05 % External Shampoo Apply 1 Application topically every other day. 1 Bottle 2

## 2025-02-23 NOTE — ED PROVIDER NOTES
Patient Seen in: [facility] Emergency Department      History     Chief Complaint   Patient presents with    Abdominal Pain     Stated Complaint: abd pain with vomiting that began this AM    Subjective:   HPI      29-year-old female complaining of abdominal pain patient has epigastric abdominal pain that started few hours ago she started with vomiting and diarrhea last evening vomited once yesterday and then 3 times today she has had about the same amount of diarrhea.  No fever or chills the pains been rather continuous for a few hours does not radiate anywhere.    Objective:     Past Medical History:    Anxiety    Binge eating disorder    Depression              Past Surgical History:   Procedure Laterality Date    Aspiration &/or injection, ganglion cyst(s) any location      Nail removal Right     Tonsillectomy                  Social History     Socioeconomic History    Marital status: Single   Tobacco Use    Smoking status: Former    Smokeless tobacco: Never   Vaping Use    Vaping status: Never Used   Substance and Sexual Activity    Alcohol use: Not Currently     Comment: occ    Drug use: Yes     Types: Cannabis     Comment: stopped 6 months ago    Sexual activity: Yes     Partners: Male     Comment: boyfriend only     Social Drivers of Health     Food Insecurity: No Food Insecurity (2/23/2025)    NCSS - Food Insecurity     Worried About Running Out of Food in the Last Year: No     Ran Out of Food in the Last Year: No   Transportation Needs: No Transportation Needs (2/23/2025)    NCSS - Transportation     Lack of Transportation: No   Housing Stability: Not At Risk (2/23/2025)    NCSS - Housing/Utilities     Has Housing: Yes     Worried About Losing Housing: No     Unable to Get Utilities: No                  Physical Exam     ED Triage Vitals [02/23/25 1026]   /85   Pulse 91   Resp 26   Temp 97 °F (36.1 °C)   Temp src Temporal   SpO2 95 %   O2 Device None (Room air)       Current Vitals:   No data  recorded      Physical Exam  The patient is alert and orient x 3 appears quite uncomfortable HEENT exam within normal limits neck there is no lymphadenopathy or JVD lungs are clear cardiovascular exam shows regular rate and rhythm without murmurs abdomen is soft there is moderate epigastric right upper and left upper quadrant tenderness no masses guarding rebound extremities normal skin is no rash.    ED Course     Labs Reviewed   COMP METABOLIC PANEL (14) - Abnormal; Notable for the following components:       Result Value    Glucose 136 (*)     CO2 18.0 (*)     All other components within normal limits   CBC WITH DIFFERENTIAL WITH PLATELET - Abnormal; Notable for the following components:    WBC 21.6 (*)     HGB 16.4 (*)     HCT 49.0 (*)     Neutrophil Absolute Prelim 18.68 (*)     Neutrophil Absolute 18.68 (*)     All other components within normal limits   URINALYSIS, ROUTINE - Abnormal; Notable for the following components:    Clarity Urine Turbid (*)     Spec Gravity >1.030 (*)     Ketones Urine 20 (*)     Blood Urine Trace (*)     Protein Urine 30 (*)     Squamous Epi. Cells Few (*)     All other components within normal limits   LIPASE - Normal   REDRAW AST (SGOT) (P) - Normal   REDRAW POTASSIUM (P) - Normal   ALT (SGPT) - Normal   POCT PREGNANCY URINE - Normal   C. DIFFICILE(TOXIGENIC)PCR - Normal   CBC, PLATELET; NO DIFFERENTIAL   SURGICAL PATHOLOGY TISSUE     EKG    Rate, intervals and axes as noted on EKG Report.  Rate: 81  Rhythm: Sinus Rhythm  Reading: Right axis deviation inferior infarct age undetermined this is an abnormal EKG              Abnormal Labs Reviewed   COMP METABOLIC PANEL (14) - Abnormal; Notable for the following components:       Result Value    Glucose 136 (*)     CO2 18.0 (*)     All other components within normal limits   CBC WITH DIFFERENTIAL WITH PLATELET - Abnormal; Notable for the following components:    WBC 21.6 (*)     HGB 16.4 (*)     HCT 49.0 (*)     Neutrophil Absolute  Prelim 18.68 (*)     Neutrophil Absolute 18.68 (*)     All other components within normal limits   URINALYSIS, ROUTINE - Abnormal; Notable for the following components:    Clarity Urine Turbid (*)     Spec Gravity >1.030 (*)     Ketones Urine 20 (*)     Blood Urine Trace (*)     Protein Urine 30 (*)     Squamous Epi. Cells Few (*)     All other components within normal limits   Labs showed a CO2 of 18 white blood cell count 21.6      CT ABDOMEN+PELVIS(CONTRAST ONLY)(CPT=74177)    Result Date: 2/23/2025  CONCLUSION:  1. Hepatic steatosis. 2. Left nephrolithiasis. 3. Liver lesions consistent with incidental hemangiomas.  No further workup is indicated in a young patient without risk factors. If there is a need to further characterize this finding, MRI with contrast would be the next imaging procedure.   LOCATION:  [facility]   Dictated by (CST): Alex Mayer MD on 2/23/2025 at 3:14 PM     Finalized by (CST): Alex Mayre MD on 2/23/2025 at 3:22 PM       US ABDOMEN COMPLETE (CPT=76700)    Result Date: 2/23/2025  CONCLUSION:   1. Limited evaluation of the pancreas, aorta, and IVC due to obscuration by bowel gas.  2. Diffuse fatty infiltration of the liver.  3. Hypoechoic lesion in the right hepatic lobe with internal echoes and no significant internal vascularity measuring up to 8 x 7 x 7 mm may represent a proteinaceous cyst.  4. Sludge in the gallbladder.  No ultrasound evidence of acute cholecystitis.   Please see above for further details.  LOCATION:  [id number]    Dictated by (CST): Donato Shafer MD on 2/23/2025 at 12:32 PM     Finalized by (CST): Donato Shafer MD on 2/23/2025 at 12:36 PM      Images independent reviewed there is hepatic steatosis hemangiomas of the liver sludge in the gallbladder       MDM      Initial differential diagnosis considered but not limited to includes appendicitis cholecystitis gastroenteritis        Medical Decision Making  Patient has sludge in gallbladder leukocytosis considerable  pain needing repeat doses of pain medication was admitted with general surgery on consult    Disposition and Plan     Clinical Impression:  1. Abdominal pain, acute    2. Acute cholecystitis         Disposition:  There is no disposition on file for this visit.  There is no disposition time on file for this visit.    Follow-up:  [facility]  [address] [id number]  [phone]    Schedule an appointment as soon as possible for a visit in 2 week(s)      Elizabeth Kowalski MD  [address] [facility] [id number] [phone]    Follow up  As needed          Medications Prescribed:  Discharge Medication List as of 2/24/2025 12:27 PM        START taking these medications    Details   !! Naloxone HCl 4 MG/0.1ML Nasal Liquid 4 mg by Intranasal route as needed (May repeat as needed in other nostril if symptoms persist). If patient remains unresponsive, repeat dose in other nostril 2-5 minutes after first dose., Normal, Disp-1 kit, R-0      ondansetron 4 MG Oral Tablet Dispersible Take 1 tablet (4 mg total) by mouth every 8 (eight) hours as needed for Nausea., Normal, Disp-15 tablet, R-0      !! Naloxone HCl 4 MG/0.1ML Nasal Liquid 4 mg by Nasal route as needed. If patient remains unresponsive, repeat dose in other nostril 2-5 minutes after first dose., Normal, Disp-1 kit, R-0       !! - Potential duplicate medications found. Please discuss with provider.              Supplementary Documentation:

## 2025-02-23 NOTE — ANESTHESIA PREPROCEDURE EVALUATION
PRE-OP EVALUATION    Patient Name: Lise Ahumada    Admit Diagnosis: No admission diagnoses are documented for this encounter.    Pre-op Diagnosis: Acute cholecystitis [K81.0]    XI ROBOT-ASSISTED LAPAROSCOPIC CHOLECYSTECTOMY WITH INDOCYANINE GREEN POSSIBLE OPEN    Anesthesia Procedure: XI ROBOT-ASSISTED LAPAROSCOPIC CHOLECYSTECTOMY WITH INDOCYANINE GREEN POSSIBLE OPEN    Surgeons and Role:     * Elizabeth Kowalski MD - Primary    Pre-op vitals reviewed.  Temp: 97 °F (36.1 °C)  Pulse: 96  Resp: 26  BP: 120/83  SpO2: 97 %  Body mass index is 39.48 kg/m².    Current medications reviewed.  Hospital Medications:   [COMPLETED] HYDROmorphone (Dilaudid) 1 MG/ML injection 1 mg  1 mg Intravenous Once    [COMPLETED] ondansetron (Zofran) 4 MG/2ML injection 4 mg  4 mg Intravenous Once    [COMPLETED] ketorolac (Toradol) 15 MG/ML injection 15 mg  15 mg Intravenous Once    [COMPLETED] sodium chloride 0.9 % IV bolus 1,000 mL  1,000 mL Intravenous Once    [COMPLETED] HYDROmorphone (Dilaudid) 1 MG/ML injection 0.5 mg  0.5 mg Intravenous Once    [COMPLETED] iopamidol 76% (ISOVUE-370) injection for power injector  100 mL Intravenous ONCE PRN    HYDROmorphone (Dilaudid) 1 MG/ML injection 0.5 mg  0.5 mg Intravenous Q30 Min PRN    [COMPLETED] ondansetron (Zofran) 4 MG/2ML injection 4 mg  4 mg Intravenous Once    indocyanine green (IC-Green) injection 5 mg  5 mg Intravenous Once    sodium chloride 0.9% infusion   Intravenous Continuous       Outpatient Medications:   Prescriptions Prior to Admission[1]    Allergies: Chlorhexidine      Anesthesia Evaluation    Patient summary reviewed.    Anesthetic Complications  (-) history of anesthetic complications         GI/Hepatic/Renal    Negative GI/hepatic/renal ROS.                             Cardiovascular    Negative cardiovascular ROS.                                                   Endo/Other    Negative endo/other ROS.                              Pulmonary    Negative pulmonary ROS.                        Neuro/Psych      (+) depression  (+) anxiety                              History reviewed. No pertinent surgical history.  Social History     Socioeconomic History    Marital status: Single   Tobacco Use    Smoking status: Former    Smokeless tobacco: Never   Vaping Use    Vaping status: Never Used   Substance and Sexual Activity    Alcohol use: Not Currently     Comment: occ    Drug use: Yes     Types: Cannabis    Sexual activity: Yes     Partners: Male     Comment: boyfriend only     History   Drug Use    Types: Cannabis     Available pre-op labs reviewed.  Lab Results   Component Value Date    WBC 21.6 (H) 02/23/2025    RBC 5.25 02/23/2025    HGB 16.4 (H) 02/23/2025    HCT 49.0 (H) 02/23/2025    MCV 93.3 02/23/2025    MCH 31.2 02/23/2025    MCHC 33.5 02/23/2025    RDW 13.3 02/23/2025    .0 02/23/2025     Lab Results   Component Value Date     02/23/2025    K 4.6 02/23/2025     02/23/2025    CO2 18.0 (L) 02/23/2025    BUN 13 02/23/2025    CREATSERUM 0.76 02/23/2025     (H) 02/23/2025    CA 9.3 02/23/2025            Airway      Mallampati: III  Mouth opening: >3 FB  TM distance: 4 - 6 cm  Neck ROM: full Cardiovascular      Rhythm: regular  Rate: normal     Dental             Pulmonary    Pulmonary exam normal.                 Other findings              ASA: 2   Plan: general  NPO status verified and           Plan/risks discussed with: patient  Use of blood product(s) discussed with: patient    Consented to blood products.          Present on Admission:   Leukocytosis   Metabolic acidosis             [1] (Not in a hospital admission)

## 2025-02-23 NOTE — CONSULTS
[facility]  Report of Surgical consultation    Lise Ahumada Patient Status:  Emergency    3/27/1995 MRN [id number]   [facility] EMERGENCY DEPARTMENT Attending Nhan Calvert MD   Hosp Day # 0 PCP PHYSICIAN NONSTAFF     Reason for consult: I am seeing this patient in consultation at the request of Dr. Clavert for abdominal pain    History of Present Illness:  Lise Ahumada is a a(n) 29 year old female who presented to hospital with 1 day history of abdominal pain.  Patient states that she had some drainage and was on chicken and frozen pizza last night.  After the pizza, she began to have some epigastric abdominal pain with associated nausea and vomiting.  Overnight, her pain began to get worse prompting her visit to the emergency room.  In the emergency room, she was hemodynamically stable.  She had a leukocytosis to 21 but serology was otherwise normal.  Ultrasound and CT imaging was performed.  Ultrasound showed cholelithiasis and a CT scan showed a distended gallbladder.  General surgery was called.  On interview, patient reports continued epigastric pain.  She also endorses nausea.  She denies having any symptoms concerning for gallbladder prior to this episode.  She does endorse having some bloating approximately 2 weeks ago but typically can eat any greasy or fatty foods.  She denies any abdominal surgeries.      History:  Past Medical History:    Anxiety    Binge eating disorder    Depression       History reviewed. No pertinent surgical history.    No family history on file.     reports that she has quit smoking. She has never used smokeless tobacco. She reports that she does not currently use alcohol. She reports current drug use. Drug: Cannabis.      Allergies:  Allergies[1]      Medications:    Current Facility-Administered Medications:     HYDROmorphone (Dilaudid) 1 MG/ML injection 0.5 mg, 0.5 mg, Intravenous, Q30 Min PRN      Review of Systems:  The review of systems was negative other than  the above listed HPI and past medical history including the HEENT, Heart, Lungs, GI, , Neuro, Musculoskeletal, Hematologic, Endocrine and Psych.       Physical Exam:  Blood pressure 133/74, pulse 94, temperature 97 °F (36.1 °C), temperature source Temporal, resp. rate 23, height 64\", weight 230 lb (104.3 kg), last menstrual period 10/24/2024, SpO2 96%, not currently breastfeeding.  General: Alert, orientated x3.  Cooperative.  No apparent distress.  HEENT: Exam is unremarkable.  Without scleral icterus.  Mucous membranes are moist. EOM are WNL.  Neck: No tenderness to palpitation.  Full range of motion to flexion and extension, lateral rotation and lateral flexion of cervical spine.  No JVD. Supple.   Lungs: Bilateral chest rise. Good excursion of the diaphragms. No secondary use of accessory respiratory musculature.  Cardiac: Regular rate and rhythm. No murmur.  Abdomen: Soft, tender to palpation in the epigastric and right upper quadrant area, nondistended  Extremities:  No lower extremity edema noted.  Without clubbing or cyanosis.  2+ pulses x4  Skin: Normal texture and turgor.      Laboratory Data and Relevant Imaging:  Recent Labs   Lab 02/23/25  1059   RBC 5.25   HGB 16.4*   HCT 49.0*   MCV 93.3   MCH 31.2   MCHC 33.5   RDW 13.3   NEPRELIM 18.68*   WBC 21.6*   .0       Recent Labs   Lab 02/23/25  1059 02/23/25  1225   *  --    BUN 13  --    CREATSERUM 0.76  --    CA 9.3  --    ALB 4.8  --      --    K  --  4.6     --    CO2 18.0*  --    ALKPHO 73  --    AST  --  21   ALT  --  20   BILT 0.5  --    TP 7.8  --          No results for input(s): \"PTP\", \"INR\", \"PTT\" in the last 168 hours.    Imaging  CT ABDOMEN+PELVIS(CONTRAST ONLY)(CPT=74177)    Result Date: 2/23/2025  CONCLUSION:  1. Hepatic steatosis. 2. Left nephrolithiasis. 3. Liver lesions consistent with incidental hemangiomas.  No further workup is indicated in a young patient without risk factors. If there is a need to further  characterize this finding, MRI with contrast would be the next imaging procedure.   LOCATION:  [facility]   Dictated by (CST): Alex Mayer MD on 2/23/2025 at 3:14 PM     Finalized by (CST): Alex Mayer MD on 2/23/2025 at 3:22 PM       US ABDOMEN COMPLETE (CPT=76700)    Result Date: 2/23/2025  CONCLUSION:   1. Limited evaluation of the pancreas, aorta, and IVC due to obscuration by bowel gas.  2. Diffuse fatty infiltration of the liver.  3. Hypoechoic lesion in the right hepatic lobe with internal echoes and no significant internal vascularity measuring up to 8 x 7 x 7 mm may represent a proteinaceous cyst.  4. Sludge in the gallbladder.  No ultrasound evidence of acute cholecystitis.   Please see above for further details.  LOCATION:  [facility]    Dictated by (CST): Donato Shafer MD on 2/23/2025 at 12:32 PM     Finalized by (CST): Donato Shafer MD on 2/23/2025 at 12:36 PM            Impression/Plan:  Patient Active Problem List   Diagnosis    Urinary tract infection    Bronchitis    Backache    Overweight    SEAN (generalized anxiety disorder)    Recurrent major depressive disorder, in partial remission    Binge eating disorder    Leukocytosis    Metabolic acidosis    Abdominal pain, acute       29 year old female with acute cholecystitis    Ultrasound and CT images were reviewed personally by me  Ultrasound shows a dilated gallbladder with some sludge consistent with cholelithiasis  CT scan shows dilated gallbladder and no other intra-abdominal pathology  She has a leukocytosis to 21 but normal LFTs  On physical exam, patient is tender to palpation in the epigastric and right upper quadrant region    Clinically, patient has acute cholecystitis  I recommend proceeding with cholecystectomy  We will plan for robotic cholecystectomy with ICG guidance  Procedure explained to the patient  Risks including bleeding, pain, infection, bile leak, injury to the common bile duct, and need for further intervention all discussed  with the patient  Patient acknowledged understanding and wishes to proceed    Thank you for letting me participate in the care of this patient    Elizabeth Kowalski MD  2/23/2025  3:22 PM         [1]   Allergies  Allergen Reactions    Chlorhexidine HIVES

## 2025-02-23 NOTE — ED INITIAL ASSESSMENT (HPI)
PT states that she has dev vomiting since last night, abdominal pain present this morning. PT states that the pian is \"sharp and severe\" , located in the middle, upper abdomen.

## 2025-02-23 NOTE — ED PROVIDER NOTES
Spoke to general surgery regarding the patient's pain prior to CAT scan returning.  Because her continued discomfort and need for doses of Dilaudid she would benefit from surgical evaluation.  I will contact surgery once again if the CAT scan shows any acute surgical findings.  In the interim patient will be admitted.  I reviewed the x-rays and agree with the radiologist report that showed hepatic steatosis and left nephrolithiasis.  Liver lesions consistent with incidental hemangiomas.    Narrative  PROCEDURE:  CT ABDOMEN+PELVIS (CONTRAST ONLY) (CPT=74177)     COMPARISON:  [facility] , US, US ABDOMEN COMPLETE (CPT=76700), 2/23/2025, 11:41 AM.  [facility] , CT, CT ABDOMEN+PELVIS(CONTRAST ONLY)(CPT=74177), 2/29/2024, 5:56 PM.     INDICATIONS:  abd pain with vomiting that began this AM     TECHNIQUE:  CT scanning was performed from the dome of the diaphragm to the pubic symphysis with non-ionic intravenous contrast material. Post contrast coronal MPR imaging was performed.  Dose reduction techniques were used. Dose information is  transmitted to the ACR (American College of Radiology) NRDR (National Radiology Data Registry) which includes the Dose Index Registry.     PATIENT STATED HISTORY:(As transcribed by Technologist)  Patient with bilateral upper quadrant pain, nausea and vomiting since this morning.      CONTRAST USED:  100cc of Isovue 370     FINDINGS:    LIVER:  Diffuse low attenuation.  Three small enhancing lesions in posterior right liver measure 14 x 11 mm on image 50 of series 2 (previously 10 x 8 mm) , 6 x 5 mm on image 49 (unchanged), and 8 x 7 mm on image 60) not seen previously.  These are  presumed benign and most consistent with incidental benign hemangiomas in a young patient without risk factors.  BILIARY:  No visible dilatation or calcification.    PANCREAS:  No lesion, fluid collection, ductal dilatation, or atrophy.    SPLEEN:  No enlargement or focal lesion.    KIDNEYS:  Tiny nonobstructing left renal  calculi measuring 1-2 mm.  No hydronephrosis.  ADRENALS:  No mass or enlargement.    AORTA/VASCULAR:  No aneurysm or dissection.    RETROPERITONEUM:  No mass or adenopathy.    BOWEL/MESENTERY:  No visible mass, obstruction, or bowel wall thickening.    ABDOMINAL WALL:  No mass or hernia.    URINARY BLADDER:  Collapsed.  No visible focal wall thickening, lesion, or calculus.    PELVIC NODES:  No adenopathy.    PELVIC ORGANS:  No visible mass.  Pelvic organs appropriate for patient age.    BONES:  No bony lesion or fracture.    LUNG BASES:  No visible pulmonary or pleural disease.                       Impression  CONCLUSION:    1. Hepatic steatosis.  2. Left nephrolithiasis.  3. Liver lesions consistent with incidental hemangiomas.  No further workup is indicated in a young patient without risk factors. If there is a need to further characterize this finding, MRI with contrast would be the next imaging procedure.        LOCATION:  [facility]        Dictated by (CST): Alex Mayer MD on 2/23/2025 at 3:14 PM      Finalized by (CST): Alex Mayer MD on 2/23/2025 at 3:22 PM

## 2025-02-23 NOTE — ED QUICK NOTES
Ok to give toradol per Dr Calvert, pt states she is \"definitely not pregnant\" and ok to take this medication.

## 2025-02-24 ENCOUNTER — TELEPHONE (OUTPATIENT)
Facility: LOCATION | Age: 30
End: 2025-02-24

## 2025-02-24 VITALS
RESPIRATION RATE: 20 BRPM | OXYGEN SATURATION: 98 % | DIASTOLIC BLOOD PRESSURE: 90 MMHG | SYSTOLIC BLOOD PRESSURE: 123 MMHG | TEMPERATURE: 98 F | BODY MASS INDEX: 47.46 KG/M2 | HEIGHT: 64 IN | HEART RATE: 84 BPM | WEIGHT: 278 LBS

## 2025-02-24 DIAGNOSIS — G89.18 POSTOPERATIVE PAIN: Primary | ICD-10-CM

## 2025-02-24 LAB
C DIFF TOX B STL QL: NEGATIVE
ERYTHROCYTE [DISTWIDTH] IN BLOOD BY AUTOMATED COUNT: 13.8 %
HCT VFR BLD AUTO: 45 %
HGB BLD-MCNC: 14.7 G/DL
MCH RBC QN AUTO: 31.1 PG (ref 26–34)
MCHC RBC AUTO-ENTMCNC: 32.7 G/DL (ref 31–37)
MCV RBC AUTO: 95.1 FL
PLATELET # BLD AUTO: 262 10(3)UL (ref 150–450)
RBC # BLD AUTO: 4.73 X10(6)UL
WBC # BLD AUTO: 10.3 X10(3) UL (ref 4–11)

## 2025-02-24 PROCEDURE — 99238 HOSP IP/OBS DSCHRG MGMT 30/<: CPT | Performed by: HOSPITALIST

## 2025-02-24 RX ORDER — TRAMADOL HYDROCHLORIDE 50 MG/1
50 TABLET ORAL AS NEEDED
Qty: 15 TABLET | Refills: 0 | Status: SHIPPED | OUTPATIENT
Start: 2025-02-24

## 2025-02-24 RX ORDER — ONDANSETRON 4 MG/1
4 TABLET, ORALLY DISINTEGRATING ORAL EVERY 8 HOURS PRN
Qty: 15 TABLET | Refills: 0 | Status: SHIPPED | OUTPATIENT
Start: 2025-02-24

## 2025-02-24 RX ORDER — TRAMADOL HYDROCHLORIDE 50 MG/1
50 TABLET ORAL AS NEEDED
Qty: 15 TABLET | Refills: 0 | Status: SHIPPED | OUTPATIENT
Start: 2025-02-24 | End: 2025-02-24

## 2025-02-24 NOTE — PLAN OF CARE
NURSING ADMISSION NOTE      Patient admitted via Cart  Oriented to room.  Safety precautions initiated.  Bed in low position.  Call light in reach.  Received patient from PACU via cart with significant other at bedside. On O2 at 3L, breath sounds clear. Assisted up to bathroom, stated she had watery stool and placed on isolation. Medicated for c/o pain. 4 lap sites closed with dermabond, no oozing noted. Admission assessment completed.

## 2025-02-24 NOTE — TELEPHONE ENCOUNTER
The patient recently called stating that she is experiencing high levels of pain opposite from where the incision from surgery is. The patient would like a call from the Nurse explain what she do.     Call back # [phone]

## 2025-02-24 NOTE — OPERATIVE REPORT
OPERATIVE NOTE    Lise Ahumada Location: OR   [facility] [id number] MRN [id number]   Admission Date 2/23/2025 Operation Date 2/23/2025   Attending Physician Elizabeth Kowalski MD Operating Physician Elizabeth Kowalski MD     PREOPERATIVE DIAGNOSIS  Acute cholecystitis    POSTOPERATIVE DIAGNOSIS  Same    PROCEDURE PERFORMED  Robotic assisted cholecystectomy  Transversus abdominis plane block    SURGEON  Elizabeth Kowalski MD    ASSISTANTS  Jarrod Rhodes, surgical assist, his assistance was necessary for the conduct of this case especially in the instrument exchange for the careful intra-abdominal dissection and gallbladder removal    ANESTHESIA  General    INDICATIONS  29 year old female presented to the hospital with 1 day history of epigastric and right upper quadrant abdominal pain.  Patient states that she had orange chicken and pizza for dinner.  Shortly after, she developed abdominal pain with nausea and vomiting.  She also had some diarrhea.  Today, her pain became acutely more painful.  She went to the emergency room was found to have a distended gallbladder and cholelithiasis on ultrasound imaging.  CT was done and just showed a distended gallbladder.  On physical exam, patient continued to have epigastric and right upper quadrant abdominal pain despite pain medication.  Clinically, patient was diagnosed with acute cholecystitis.  She had a leukocytosis to 21.  Cholecystectomy was indicated.  The procedure and all the risks were discussed with the patient and she agreed.    FINDINGS/DESCRIPTION OF PROCEDURE  After informed consent, patient was brought to the operating room and placed in supine position. Bilateral SCDs were placed and pre-operative antibiotics administered. Anesthesia was induced without any complication. Patient was prepped and draped in the usual sterile fashion. Time out was performed confirming patient, procedure, and site.    The Veress needle was used to gain pneumoperitoneum. Singh's point was  identified. Using blade, an incision was made. Veress needle was inserted with audible clicks. On saline aspiration, no bladder or bowel contents was seen.  Saline flowed easily, confirming presence in the abdomen. Pneumoperitoneum was created with CO2.  Once the abdomen was insufflated, proposed lines of incisions for robotic ports were placed.  Using blade, incision was made in the left periumbilical region and 8mm port was inserted under optiview, clearly seeing all layers of the abdominal wall. Upon entrance, no injury was noted to omentum or bowel at site of entrance.  There was a small puncture to the liver at the site of Veress needle injury but this was hemostatic. Three other 8 mm ports were placed under direct visualization. Transversus abdominis plane block was performed. The Sim[employer] Xi robot was docked.    Gallbladder was found to be soft.  There was minimal edema. The gallbladder was grasped at the fundus and retracted superiorly above the liver. The infundibulum was retracted inferolaterally in order to facilitate the dissection of the hepatocystic triangle. Careful dissection was carried out to obtain the critical view of safety, clearing all fat and fibrous tissue out of the hepatocystic triangle. The cystic duct and artery were identified and were the only structures entering the gallbladder.  Patient was additionally administered indocyanine green preoperatively.  Using firefly mode, I was able to confirm the gallbladder, the cystic duct, and the common bile duct, clearly staying away from the common bile duct.  The common bile duct and cystic duct were both not dilated. The cystic duct and artery were then clipped with a hem-o-loc and divided. The gallbladder was then removed from the liver bed using electrocautery.  The gallbladder was placed in Endo Catch bag.  The gallbladder fossa and above the liver was irrigated.  Hemostasis was adequate.  The robot was undocked.  Gallbladder was removed  from the abdomen and sent to pathology.  Using Oscar Sampson, fascia at the extraction site was closed with o vicryl.  Hemostasis at the extraction site was also achieved with cautery.  Pneumoperitoneum was evacuated.  Incisions were closed with 4-o Monocryl suture. Wounds were washed and dressed with dermabond.    At the end of the case, all counts were correct. Patient tolerated procedure well. Patient was awakened and transferred to PACU in a hemodynamically stable condition.    SPECIMENS REMOVED  Gallbladder    ESTIMATED BLOOD LOSS  15 ml    COMPLICATIONS  None      Elizabeth Kowalski MD

## 2025-02-24 NOTE — DISCHARGE INSTRUCTIONS
Cholecystectomy  Dr. Elizabeth Kowalski    MEDICATIONS  For post-operative pain control, the medications are usually oxycodone.  This is a narcotic and is best taken by starting with a half a tablet every four to six hours as needed.  If the patient does not feel they need the narcotics they shouldn’t take them.  If the pain is severe the patient may take a whole pill every six hours.  The patient can take tylenol 1g three times a day and ibuprofen 400-600mg in between up to 4 times a day as needed for pain as well.  The patient can take zofran 4mg every 6 hours as needed for nausea. The patient can also take miralax or colace as needed for constipation. Please ask your surgeon before resuming blood thinners such as aspirin, Plavix or Coumadin.  All other home medications may be resumed as scheduled. With severe cholecystitis, antibiotics will also be prescribed.  With antibiotics, please watch for rash, facial swelling or severe diarrhea.    DIET  The patient may resume a general diet immediately.  This is not a good time to eat excessively.  The patient should eat in moderation and stick with foods the patient feels are easy to digest.  Avoid high fat foods in the immediate post-operative time period as this may still cause some problems.  The patient may eat anything in small amounts but foods rich in dairy products, fatty foods or fried foods may cause an upset stomach for up to six weeks after surgery.  There should be no alcohol consumption in the immediate recovery time period or within six hours of taking narcotics.    WOUND CARE  The dressing is usually a dissolvable glue which protects the wound. The patient can take a shower starting the day after surgery, but please, no baths or soaking. Please do not put any creams or ointments on the surgical incisions. If the patient does have a top dressing with clear tape and gauze, this dressing may be removed in 2 days. Do not remove the steri-strips or butterfly  tapes that are white and adherent to the skin.  The steri-strips will eventually peel up at the ends and at this point they may be removed.  This is usually seven to ten days after surgery.  When showering, soap can get on the wounds but do not scrub over the wounds.  No hair dye or chemicals of any kind should get in the wounds.  Avoid tub baths, swimming or sitting in a hot tub for two weeks.  If visible sutures or staples are present they will be removed in the office by the surgeon or nurse.  Most wounds will be closed with dissolving suture underneath the skin.  These sutures will dissolve on their own.  If a drain is present make sure the patient receives drain care instructions from the nurse prior to discharge.  Most patients will not have a drain.    ACTIVITY  Every day the patient should be up, showered and dressed.  Each day the patient should be up and around the house.  The patient should not lie in bed and should not stay in pajamas.  We count on the patient being up, coughing, walking and deep breathing to avoid pneumonia and blood clots in the legs.  Once a day the patient should get out of the house and go shopping, go to the mall, the [employer] store, the [employer] or a restaurant.  The patient may ride in a car but should not drive the car for at least one week.  Patients should be off narcotics for at least 8 hours prior to being a .  The average time off work is 10 to 14 days; most adults will be seeing the surgeon prior to returning to work.  Students will return to school within 1-5 days after discharge from the hospital but will be off gym, sports, and indoors for recess for one month.  Patients may go up and down stairs and lift up to five pounds but no bending, pushing or pulling.  Nothing called work or exercise until the follow up visit.  No ‘stair-master’, power walking, jogging or workouts until the follow up visit.  Patients should seek further activity limits at the time of their  appointment.    APPOINTMENT  Please call our office for an appointment within five to ten days of discharge.  Any fever greater than 100.5, chills, nausea, vomiting, or severe diarrhea please call our office.  If the wound turns red, hot, swollen, becomes increasingly painful, or drains pus call us immediately at [phone].  For life threatening emergencies call 911.  For non-emergent care please call our office after 8:30 a.m. Monday through Friday.  The number listed above is our office number; our phone automatically switches to our answering service if we are not there.  Please do not use the emergency room for non-urgent care.    Thank you for entrusting us with your care.  [facility]--General Surgery

## 2025-02-24 NOTE — PLAN OF CARE
Assumed patient care at 0730.  Vital signs stable.  Patient alert and oriented x 4.  Pain controlled with PRN medications.  Incisions clean and dry.  Patient ambulating in the halls and ate breakfast and lunch.  Discharge orders received. IV and tele discontinued.  Discharge instructions reviewed with the patient and she verbalized understanding. Patient discharged home with prescription and follow up information - all questions answered.     Problem: GASTROINTESTINAL - ADULT  Goal: Minimal or absence of nausea and vomiting  Description: INTERVENTIONS:  - Maintain adequate hydration with IV or PO as ordered and tolerated  - Nasogastric tube to low intermittent suction as ordered  - Evaluate effectiveness of ordered antiemetic medications  - Provide nonpharmacologic comfort measures as appropriate  - Advance diet as tolerated, if ordered  - Obtain nutritional consult as needed  - Evaluate fluid balance  Outcome: Adequate for Discharge  Goal: Maintains or returns to baseline bowel function  Description: INTERVENTIONS:  - Assess bowel function  - Maintain adequate hydration with IV or PO as ordered and tolerated  - Evaluate effectiveness of GI medications  - Encourage mobilization and activity  - Obtain nutritional consult as needed  - Establish a toileting routine/schedule  - Consider collaborating with pharmacy to review patient's medication profile  Outcome: Adequate for Discharge     Problem: METABOLIC/FLUID AND ELECTROLYTES - ADULT  Goal: Electrolytes maintained within normal limits  Description: INTERVENTIONS:  - Monitor labs and rhythm and assess patient for signs and symptoms of electrolyte imbalances  - Administer electrolyte replacement as ordered  - Monitor response to electrolyte replacements, including rhythm and repeat lab results as appropriate  - Fluid restriction as ordered  - Instruct patient on fluid and nutrition restrictions as appropriate  Outcome: Adequate for Discharge     Problem: SKIN/TISSUE  INTEGRITY - ADULT  Goal: Incision(s), wounds(s) or drain site(s) healing without S/S of infection  Description: INTERVENTIONS:  - Assess and document risk factors for pressure ulcer development  - Assess and document skin integrity  - Assess and document dressing/incision, wound bed, drain sites and surrounding tissue  - Implement wound care per orders  - Initiate isolation precautions as appropriate  - Initiate Pressure Ulcer prevention bundle as indicated  Outcome: Adequate for Discharge     Problem: PAIN - ADULT  Goal: Verbalizes/displays adequate comfort level or patient's stated pain goal  Description: INTERVENTIONS:  - Encourage pt to monitor pain and request assistance  - Assess pain using appropriate pain scale  - Administer analgesics based on type and severity of pain and evaluate response  - Implement non-pharmacological measures as appropriate and evaluate response  - Consider cultural and social influences on pain and pain management  - Manage/alleviate anxiety  - Utilize distraction and/or relaxation techniques  - Monitor for opioid side effects  - Notify MD/LIP if interventions unsuccessful or patient reports new pain  - Anticipate increased pain with activity and pre-medicate as appropriate  Outcome: Adequate for Discharge

## 2025-02-24 NOTE — ANESTHESIA PROCEDURE NOTES
Airway  Date/Time: 2/23/2025 6:31 PM  Urgency: elective    Airway not difficult    General Information and Staff    Patient location during procedure: OR  Anesthesiologist: Cynthia Garcia MD  Performed: anesthesiologist   Performed by: Cynthia Garcia MD  Authorized by: Cynthia Garcia MD      Indications and Patient Condition  Indications for airway management: anesthesia  Spontaneous Ventilation: absent  Sedation level: deep  Preoxygenated: yes  Patient position: ramp  Mask difficulty assessment: 1 - vent by mask    Final Airway Details  Final airway type: endotracheal airway      Successful airway: ETT  Cuffed: yes   Successful intubation technique: direct laryngoscopy  Endotracheal tube insertion site: oral  Blade: Conor  Blade size: #3  ETT size (mm): 7.0    Cormack-Lehane Classification: grade I - full view of glottis  Placement verified by: capnometry   Cuff volume (mL): 7  Measured from: lips  ETT to lips (cm): 20  Number of attempts at approach: 1  Number of other approaches attempted: 0    Additional Comments  Smooth induction, smooth intubation, no trauma

## 2025-02-24 NOTE — ANESTHESIA PROCEDURE NOTES
Peripheral IV  Date/Time: 2/23/2025 6:37 PM  Inserted by: Cynthia Garcia MD    Placement  Needle size: 18 G  Laterality: left  Location: hand  Site prep: alcohol  Attempts: 1

## 2025-02-24 NOTE — ANESTHESIA POSTPROCEDURE EVALUATION
[facility]    Lise Ahumada Patient Status:  Emergency   Age/Gender 29 year old female MRN [id number]   Location [facility] SURGERY Attending Elizabeth Kowalski MD   Hosp Day # 0 PCP PHYSICIAN NONSTAFF       Anesthesia Post-op Note    XI ROBOT-ASSISTED LAPAROSCOPIC CHOLECYSTECTOMY WITH INDOCYANINE GREEN POSSIBLE OPEN    Procedure Summary       Date: 02/23/25 Room / Location:  MAIN OR 09 / [facility] MAIN OR    Anesthesia Start: 1819 Anesthesia Stop: 2028    Procedure: XI ROBOT-ASSISTED LAPAROSCOPIC CHOLECYSTECTOMY WITH INDOCYANINE GREEN POSSIBLE OPEN Diagnosis:       Acute cholecystitis      (Acute cholecystitis [K81.0])    Surgeons: Elizabeth Kowalski MD Anesthesiologist: Bill Bowden MD    Anesthesia Type: general ASA Status: 2            Anesthesia Type: general    Vitals Value Taken Time   /76 02/23/25 2028   Temp 99/1 02/23/25 2028   Pulse 104 02/23/25 2028   Resp 34 02/23/25 2028   SpO2 94 02/23/25 2028           Patient Location: PACU    Anesthesia Type: general    Airway Patency: patent    Postop Pain Control: adequate    Mental Status: preanesthetic baseline    Nausea/Vomiting: none    Cardiopulmonary/Hydration status: stable euvolemic    Complications: no apparent anesthesia related complications    Postop vital signs: stable    Dental Exam: Unchanged from Preop    Patient to be discharged from PACU when criteria met.

## 2025-02-24 NOTE — PROGRESS NOTES
[facility]  Progress Note    Lise Ahumada Patient Status:  Observation    3/27/1995 MRN [id number]   Location [facility] 0SW-A Attending Elizabeth Kowalski MD   Hosp Day # 0 PCP PHYSICIAN NONSTAFF     Subjective:  Patient resting in hospital bed at time of encounter. She states she is overall doing well since surgery. She states the pain that was present at her time of admission has resolved, but endorses having incisional tenderness on the left side of abdomen today. She states she has been tolerating clear liquids, no nausea or vomiting. She reports passing a few loose bowel movements since surgery. She denies fever or chills.    Objective/Physical Exam:  General: Alert, orientated x3.  Cooperative.  No apparent distress.  Vital Signs:  Blood pressure 105/56, pulse 89, temperature 97.7 °F (36.5 °C), resp. rate 22, height 64\", weight 278 lb (126.1 kg), last menstrual period 10/24/2024, SpO2 96%, not currently breastfeeding.  Wt Readings from Last 3 Encounters:   25 278 lb (126.1 kg)   24 267 lb (121.1 kg)   21 200 lb (90.7 kg)     Lungs: No respiratory distress.  Cardiac: Regular rate and rhythm.   Abdomen:  Soft, non distended, appropriate incisional tenderness, with no rebound or guarding.  No peritoneal signs.   Extremities:  No lower extremity edema noted.    Incision: Robotic incisions covered with dermabond, clean, dry, intact, no erythema    Intake/Output:    Intake/Output Summary (Last 24 hours) at 2025 0745  Last data filed at 2025 2105  Gross per 24 hour   Intake 1800 ml   Output 15 ml   Net 1785 ml     I/O last 3 completed shifts:  In: 1800 [I.V.:1800]  Out: 15 [Blood:15]  No intake/output data recorded.    Medications:    amphetamine-dextroamphetamine  5 mg Oral BID AC    acetaminophen  1,000 mg Oral Q8H SYLVIE    ketorolac  30 mg Intravenous Q6H    Norethindrone-Eth Estradiol  1 tablet Oral Daily       Labs:  Lab Results   Component Value Date    WBC 21.6 2025     HGB 16.4 02/23/2025    HCT 49.0 02/23/2025    .0 02/23/2025     Lab Results   Component Value Date     02/23/2025    K 4.6 02/23/2025     02/23/2025    CO2 18.0 02/23/2025    BUN 13 02/23/2025    CREATSERUM 0.76 02/23/2025     02/23/2025    CA 9.3 02/23/2025    ALKPHO 73 02/23/2025    ALT 20 02/23/2025    AST 21 02/23/2025    BILT 0.5 02/23/2025    ALB 4.8 02/23/2025    TP 7.8 02/23/2025     No results found for: \"PT\", \"INR\"      Assessment  Patient Active Problem List   Diagnosis    Urinary tract infection    Bronchitis    Backache    Overweight    SEAN (generalized anxiety disorder)    Recurrent major depressive disorder, in partial remission    Binge eating disorder    Leukocytosis    Metabolic acidosis    Abdominal pain, acute       POD 1 Robotic-assisted cholecystectomy, Transversus abdominis plane block    Plan:  Advance diet as tolerated  Continue analgesics and antiemetics as needed  Ambulate and up to chair  DVT prophylaxis with SCDs  Patient is doing well post-operatively, she is stable for discharge to home with plan to follow up with [facility] in 2 weeks.    Quality:  DVT Mechanical Prophylaxis:   SCDs, Early ambuation  DVT Pharmacologic Prophylaxis   Medication   None                Code Status: Not on file  Marsh: No urinary catheter in place  Marsh Duration (in days):   Central line:    VALENTIN:         Ericka Jimenez PA-C  2/24/2025  7:45 AM      The patient was discussed with Ashlyn Guillen PA-C

## 2025-02-25 ENCOUNTER — TELEPHONE (OUTPATIENT)
Facility: LOCATION | Age: 30
End: 2025-02-25

## 2025-02-25 RX ORDER — OXYCODONE HYDROCHLORIDE 5 MG/1
5 TABLET ORAL EVERY 8 HOURS PRN
Qty: 10 TABLET | Refills: 0 | Status: SHIPPED | OUTPATIENT
Start: 2025-02-25

## 2025-02-25 NOTE — TELEPHONE ENCOUNTER
Pt called c/o pain level at 7/10.  Pt req other pain medication besides tramadol & tylenol.  Pls advise.

## 2025-02-26 NOTE — DISCHARGE SUMMARY
[facility]     Lise Ahumada Patient Status:  Observation    3/27/1995 MRN [id number]   Location [facility] 0SW-A Attending No att. providers found   Hosp Day # 0 PCP PHYSICIAN NONSTAFF     Date of Admission: 2025  Date of Discharge:  2025     Discharge Disposition: Home or Self Care    Discharge Diagnosis:  Hepatic steatosis  Acute cholecystitis  Morbid obesity    History of Present Illness:  29 year old female with history of depression and anxiety and PTSD and eating disorder morbid obesity BMI 40 presents to emergency room with abdominal pain that started last night.  Patient states that she did not eat out.  She denies fever chills hematemesis melena hemoptysis.  She describes the pain localized in the right upper quadrant radiating to the right CVA area as well as to the epigastric area 7 8 out of 10 dull in nature and persistent.     Brief Synopsis: Patient underwent Robotic assisted cholecystectomy  Transversus abdominis plane block    Without any complications.She will f/u pcp for gi referral and wt loss clinic referral.    Lace+ Score: 46  59-90 High Risk  29-58 Medium Risk  0-28   Low Risk       TCM Follow-Up Recommendation:  LACE 29-58: Moderate Risk of readmission after discharge from the hospital.      Procedures during hospitalization:   As above    Incidental or significant findings and recommendations (brief descriptions):      Lab/Test results pending at Discharge:       Consultants:  surgery    Discharge Medication List:     Discharge Medications        START taking these medications        Instructions Prescription details   Naloxone HCl 4 MG/0.1ML Liqd      4 mg by Intranasal route as needed (May repeat as needed in other nostril if symptoms persist). If patient remains unresponsive, repeat dose in other nostril 2-5 minutes after first dose.   Quantity: 1 kit  Refills: 0     Naloxone HCl 4 MG/0.1ML Liqd      4 mg by Nasal route as needed. If patient remains  unresponsive, repeat dose in other nostril 2-5 minutes after first dose.   Quantity: 1 kit  Refills: 0     ondansetron 4 MG Tbdp  Commonly known as: Zofran-ODT      Take 1 tablet (4 mg total) by mouth every 8 (eight) hours as needed for Nausea.   Quantity: 15 tablet  Refills: 0     traMADol 50 MG Tabs  Commonly known as: Ultram      Take 1 tablet (50 mg total) by mouth as needed for Pain. 1 tablet by mouth every 4-6 hours as needed for pain.   Quantity: 15 tablet  Refills: 0            CHANGE how you take these medications        Instructions Prescription details   fluticasone propionate 50 MCG/ACT Susp  Commonly known as: Flonase  What changed:   how much to take  how to take this  when to take this  additional instructions      U 2 SPRAYS IEN QAM   Quantity: 1 Bottle  Refills: 11            CONTINUE taking these medications        Instructions Prescription details   acetaminophen 325 MG Tabs  Commonly known as: Tylenol      Take 2 tablets (650 mg total) by mouth every 6 (six) hours as needed for Pain or Fever.   Refills: 0     albuterol 108 (90 Base) MCG/ACT Aers  Commonly known as: Ventolin HFA      Inhale 2 puffs into the lungs as needed.   Refills: 0     cyclobenzaprine 10 MG Tabs  Commonly known as: Flexeril      Take 1 tablet (10 mg total) by mouth 3 (three) times daily as needed.   Refills: 0     Nortrel 1/35 (28) 1-35 MG-MCG Tabs  Generic drug: Norethindrone-Eth Estradiol      TAKE 1 TABLET BY MOUTH DAILY   Quantity: 84 tablet  Refills: 0     Vyvanse 30 MG Caps  Generic drug: lisdexamfetamine      Take 1 capsule (30 mg total) by mouth every morning.   Refills: 0               Where to Get Your Medications        [id number] - [place], [place] - [address] AT [facility] [address], [phone], [phone] [address], [facility] [address]      Phone: [phone]   Naloxone HCl 4 MG/0.1ML Liqd  Naloxone HCl 4 MG/0.1ML  Liqd  ondansetron 4 MG Tbdp  traMADol 50 MG Tabs         [place] reviewed:     Follow-up appointment:   [facility]   [place] [id number] [phone]    Schedule an appointment as soon as possible for a visit in 2 week(s)      Elizabeth Kowalski MD   [facility] [id number] [phone]    Follow up  As needed    Appointments for Next 30 Days 2025 - 3/28/2025      None            Vital signs:       Physical Exam:    General: No acute distress   Lungs: clear to auscultation  Cardiovascular: S1, S2  Abdomen: Soft      -----------------------------------------------------------------------------------------------  PATIENT DISCHARGE INSTRUCTIONS: See electronic chart    Whitney Ramírez MD    Total time spent on discharge plannin minutes     The  [employer] Cures Act makes medical notes like these available to patients in the interest of transparency. Please be advised this is a medical document. Medical documents are intended to carry relevant information, facts as evident, and the clinical opinion of the practitioner. The medical note is intended as peer to peer communication and may appear blunt or direct. It is written in medical language and may contain abbreviations or verbiage that are unfamiliar.

## 2025-03-11 ENCOUNTER — OFFICE VISIT (OUTPATIENT)
Facility: LOCATION | Age: 30
End: 2025-03-11
Payer: MEDICAID

## 2025-03-11 VITALS
DIASTOLIC BLOOD PRESSURE: 64 MMHG | SYSTOLIC BLOOD PRESSURE: 100 MMHG | OXYGEN SATURATION: 97 % | HEART RATE: 111 BPM | TEMPERATURE: 97 F

## 2025-03-11 DIAGNOSIS — Z98.890 POST-OPERATIVE STATE: Primary | ICD-10-CM

## 2025-03-11 PROBLEM — E87.20 METABOLIC ACIDOSIS: Status: RESOLVED | Noted: 2025-02-23 | Resolved: 2025-03-11

## 2025-03-11 PROBLEM — D72.829 LEUKOCYTOSIS: Status: RESOLVED | Noted: 2025-02-23 | Resolved: 2025-03-11

## 2025-03-11 PROBLEM — R10.9 ABDOMINAL PAIN, ACUTE: Status: RESOLVED | Noted: 2025-02-23 | Resolved: 2025-03-11

## 2025-03-11 PROCEDURE — 99024 POSTOP FOLLOW-UP VISIT: CPT | Performed by: PHYSICIAN ASSISTANT

## 2025-03-11 NOTE — PROGRESS NOTES
Post Operative Visit Note       Active Problems  1. Post-operative state         Chief Complaint   Chief Complaint   Patient presents with    Post-Op     PO - 2/23 w/ [facility] -Robotic assisted cholecystectomy. Incisions are itchy, little red. No fevers. Will get nauseous.            History of Present Illness   The patient presents today for postoperative visit following robotic laparoscopic cholecystectomy on 2/23/2025 by Dr. Kowalski    The patient states that since her surgery that she is overall doing very well.  She denies complaints today.  She denies nausea or vomiting.  She denies diarrhea or constipation.  She denies fever or chills.  She states that her incisions are healing well.  There is no redness or drainage noted.    Allergies  Lise is allergic to chlorhexidine and melons.    Past Medical / Surgical / Social / Family History    The past medical and past surgical history have been reviewed by me today.     Past Medical History:    Anxiety    Binge eating disorder    Depression     Past Surgical History:   Procedure Laterality Date    Aspiration &/or injection, ganglion cyst(s) any location      Laparoscopic cholecystectomy  February 2025    Nail removal Right     Other surgical history  December 2018    Ganglion Cyst Removal in Wrist    Tonsillectomy         The family history and social history have been reviewed by me today.    Family History   Problem Relation Age of Onset    Cancer Paternal Grandmother      Social History     Socioeconomic History    Marital status: Single   Tobacco Use    Smoking status: Former    Smokeless tobacco: Never   Vaping Use    Vaping status: Never Used   Substance and Sexual Activity    Alcohol use: Never     Comment: occ    Drug use: Not Currently     Types: Cannabis     Comment: stopped 6 months ago    Sexual activity: Yes     Partners: Male     Comment: boyfriend only        Current Outpatient Medications:     albuterol 108 (90 Base) MCG/ACT Inhalation Aero Soln,  Inhale 2 puffs into the lungs as needed., Disp: , Rfl:     VYVANSE 30 MG Oral Cap, Take 1 capsule (30 mg total) by mouth every morning., Disp: , Rfl:     cyclobenzaprine 10 MG Oral Tab, Take 1 tablet (10 mg total) by mouth 3 (three) times daily as needed., Disp: , Rfl:     acetaminophen 325 MG Oral Tab, Take 2 tablets (650 mg total) by mouth every 6 (six) hours as needed for Pain or Fever., Disp: , Rfl:     NORTREL 1/35, 28, 1-35 MG-MCG Oral Tab, TAKE 1 TABLET BY MOUTH DAILY, Disp: 84 tablet, Rfl: 0    Fluticasone Propionate 50 MCG/ACT Nasal Suspension, U 2 SPRAYS IEN QAM (Patient taking differently: 2 sprays by Nasal route daily.), Disp: 1 Bottle, Rfl: 11      Review of Systems  A 10 point Review of Systems has been completed by me today and is negative except as above in the HPI.    Physical Findings   /64 (BP Location: Left arm, Patient Position: Sitting, Cuff Size: large)   Pulse 111   Temp 97.2 °F (36.2 °C) (Temporal)   LMP 10/24/2024 (Exact Date)   SpO2 97%   Gen/psych: alert and oriented, cooperative, no apparent distress  Cardiovascular: regular rate  Respiratory: respirations unlabored, no wheeze  Abdominal: soft, non-tender, non-distended, no guarding/rebound  Incisions: Incisions are healing well.  There is no redness or drainage noted.         Assessment/Plan  1. Post-operative state        1. The patient is doing well following laparoscopic cholecystectomy.  2. The patient is to continue with diet as tolerated.  3. The patient is to continue with lifting restrictions of no more than 20 pounds for 4 weeks from the date of the surgery.  4. Surgical pathology was discussed with the patient.  5. All questions and concerns were answered.  6. The patient is return to the clinic as needed.         No orders of the defined types were placed in this encounter.       Imaging & Referrals   None    Follow Up  Return if symptoms worsen or fail to improve.    Mercy Ruiz PA-C  [facility] General  Surgery  3/11/2025  3:49 PM

## (undated) DEVICE — SYRINGE MED 10ML LL TIP W/O SFTY DISP

## (undated) DEVICE — BINDER ABD UNISX 9IN 62IN L AND XL UNIV

## (undated) DEVICE — DAVINCI XI CLIP HEM O LOK LARGE PURPLE

## (undated) DEVICE — Device: Brand: SUTURE PASSOR PRO

## (undated) DEVICE — FENESTRATED BIPOLAR FORCEPS: Brand: ENDOWRIST

## (undated) DEVICE — ANCHOR TISSUE RETRIEVAL SYSTEM, BAG SIZE 125 ML, PORT SIZE 8 MM: Brand: ANCHOR TISSUE RETRIEVAL SYSTEM

## (undated) DEVICE — PROGRASP FORCEPS: Brand: ENDOWRIST

## (undated) DEVICE — SUT COAT VCRL + 0 54IN ABSRB UD ANTIBACT

## (undated) DEVICE — LARGE HEM-O-LOK CLIP APPLIER: Brand: ENDOWRIST

## (undated) DEVICE — BLADELESS OBTURATOR: Brand: WECK VISTA

## (undated) DEVICE — MONOPOLAR CURVED SCISSORS: Brand: ENDOWRIST

## (undated) DEVICE — GLOVE SUR 6.5 SENSICARE PI PIP CRM PWD F

## (undated) DEVICE — GLOVE SUR 6.5 SENSICARE PI PIP GRN PWD F

## (undated) DEVICE — COVER,LIGHT,CAMERA,HARD,1/PK,STRL: Brand: MEDLINE

## (undated) DEVICE — TIP COVER ACCESSORY

## (undated) DEVICE — COLUMN DRAPE

## (undated) DEVICE — ARM DRAPE

## (undated) DEVICE — SUT MCRYL 4-0 18IN PS-2 ABSRB UD 19MM 3/8 CIR

## (undated) DEVICE — LAPAROVUE VISIBILITY SYSTEM LAPAROSCOPIC SOLUTIONS: Brand: LAPAROVUE

## (undated) DEVICE — ROBOTIC GENERAL: Brand: MEDLINE INDUSTRIES, INC.

## (undated) DEVICE — SEAL

## (undated) DEVICE — ENDOPATH ULTRA VERESS INSUFFLATION NEEDLES WITH LUER LOCK CONNECTORS: Brand: ENDOPATH

## (undated) DEVICE — ADHESIVE SKIN TOP FOR WND CLSR DERMBND ADV

## (undated) DEVICE — 40580 - THE PINK PAD - ADVANCED TRENDELENBURG POSITIONING KIT: Brand: 40580 - THE PINK PAD - ADVANCED TRENDELENBURG POSITIONING KIT

## (undated) NOTE — LETTER
[address]  [address]  Authorization for Surgical Operation and Procedure     Date:___________                                                                                                         Time:__________  I hereby authorize Surgeon(s):  Elizabeth Kowalski MD, my physician and his/her assistants (if applicable), which may include medical students, residents, and/or fellows, to perform the following surgical operation/ procedure and administer such anesthesia as may be determined necessary by my physician:  Operation/Procedure name (s) Procedure(s):  XI ROBOT-ASSISTED LAPAROSCOPIC CHOLECYSTECTOMY WITH INDOCYANINE GREEN POSSIBLE OPEN on Lise Ahumada   2.   I recognize that during the surgical operation/procedure, unforeseen conditions may necessitate additional or different procedures than those listed above.  I, therefore, further authorize and request that the above-named surgeon, assistants, or designees perform such procedures as are, in their judgment, necessary and desirable.    3.   My surgeon/physician has discussed prior to my surgery the potential benefits, risks and side effects of this procedure; the likelihood of achieving goals; and potential problems that might occur during recuperation.  They also discussed reasonable alternatives to the procedure, including risks, benefits, and side effects related to the alternatives and risks related to not receiving this procedure.  I have had all my questions answered and I acknowledge that no guarantee has been made as to the result that may be obtained.    4.   Should the need arise during my operation/procedure, which includes change of level of care prior to discharge, I also consent to the administration of blood and/or blood products.  Further, I understand that despite careful testing and screening of blood or blood products by collecting agencies, I may still be subject to ill effects as a result of receiving a  blood transfusion and/or blood products.  The following are some, but not all, of the potential risks that can occur: fever and allergic reactions, hemolytic reactions, transmission of diseases such as Hepatitis, AIDS and Cytomegalovirus (CMV) and fluid overload.  In the event that I wish to have an autologous transfusion of my own blood, or a directed donor transfusion, I will discuss this with my physician.  Check only if Refusing Blood or Blood Products  I understand refusal of blood or blood products as deemed necessary by my physician may have serious consequences to my condition to include possible death. I hereby assume responsibility for my refusal and release the hospital, its personnel, and my physicians from any responsibility for the consequences of my refusal.          o  Refuse      5.   I authorize the use of any specimen, organs, tissues, body parts or foreign objects that may be removed from my body during the operation/procedure for diagnosis, research or teaching purposes and their subsequent disposal by hospital authorities.  I also authorize the release of specimen test results and/or written reports to my treating physician on the hospital medical staff or other referring or consulting physicians involved in my care, at the discretion of the Pathologist or my treating physician.    6.   I consent to the photographing or videotaping of the operations or procedures to be performed, including appropriate portions of my body for medical, scientific, or educational purposes, provided my identity is not revealed by the pictures or by descriptive texts accompanying them.  If the procedure has been photographed/videotaped, the surgeon will obtain the original picture, image, videotape or CD.  The hospital will not be responsible for storage, release or maintenance of the picture, image, tape or CD.    7.   I consent to the presence of a  or observers in the operating room as deemed  necessary by my physician or their designees.    8.   I recognize that in the event my procedure results in extended X-Ray/fluoroscopy time, I may develop a skin reaction.    9. If I have a Do Not Attempt Resuscitation (DNAR) order in place, that status will be suspended while in the operating room, procedural suite, and during the recovery period unless otherwise explicitly stated by me (or a person authorized to consent on my behalf). The surgeon or my attending physician will determine when the applicable recovery period ends for purposes of reinstating the DNAR order.  10. Patients having a sterilization procedure: I understand that if the procedure is successful the results will be permanent and it will therefore be impossible for me to inseminate, conceive, or bear children.  I also understand that the procedure is intended to result in sterility, although the result has not been guaranteed.   11. I acknowledge that my physician has explained sedation/analgesia administration to me including the risk and benefits I consent to the administration of sedation/analgesia as may be necessary or desirable in the judgment of my physician.    I CERTIFY THAT I HAVE READ AND FULLY UNDERSTAND THE ABOVE CONSENT TO OPERATION and/or OTHER PROCEDURE.    _________________________________________  __________________________________  Signature of Patient     Signature of Responsible Person         ___________________________________         Printed Name of Responsible Person           _________________________________                 Relationship to Patient  _________________________________________  ______________________________  Signature of Witness          Date  Time      Patient Name: Lise Ahumada     : 3/27/1995                 Printed: 2025     Medical Record #: [id number]                     Page 1 of 2                                    [address]  [address]    Consent  for Anesthesia    I, Lise Ahumada agree to be cared for by an anesthesiologist, who is specially trained to monitor me and give me medicine to put me to sleep or keep me comfortable during my procedure    I understand that my anesthesiologist is not an employee or agent of [facility] or [employer]. He or she works for [employer].    As the patient asking for anesthesia services, I agree to:  Allow the anesthesiologist (anesthesia doctor) to give me medicine and do additional procedures as necessary. Some examples are: Starting or using an “IV” to give me medicine, fluids or blood during my procedure, and having a breathing tube placed to help me breathe when I’m asleep (intubation). In the event that my heart stops working properly, I understand that my anesthesiologist will make every effort to sustain my life, unless otherwise directed by [facility] Do Not Resuscitate documents.  Tell my anesthesia doctor before my procedure:  If I am pregnant.  The last time that I ate or drank.  All of the medicines I take (including prescriptions, herbal supplements, and pills I can buy without a prescription (including street drugs/illegal medications). Failure to inform my anesthesiologist about these medicines may increase my risk of anesthetic complications.  If I am allergic to anything or have had a reaction to anesthesia before.  I understand how the anesthesia medicine will help me (benefits).  I understand that with any type of anesthesia medicine there are risks:  The most common risks are: nausea, vomiting, sore throat, muscle soreness, damage to my eyes, mouth, or teeth (from breathing tube placement).  Rare risks include: remembering what happened during my procedure, allergic reactions to medications, injury to my airway, heart, lungs, vision, nerves, or muscles and in extremely rare instances death.  My doctor has explained to me other choices available to me for my care  (alternatives).  Pregnant Patients (“epidural”):  I understand that the risks of having an epidural (medicine given into my back to help control pain during labor), include itching, low blood pressure, difficulty urinating, headache or slowing of the baby’s heart. Very rare risks include infection, bleeding, seizure, irregular heart rhythms and nerve injury.  Regional Anesthesia (“spinal”, “epidural”, & “nerve blocks”):  I understand that rare but potential complications include headache, bleeding, infection, seizure, irregular heart rhythms, and nerve injury.    I can change my mind about having anesthesia services at any time before I get the medicine.    _____________________________________________________________________________  Patient (or Representative) Signature/Relationship to Patient  Date   Time    _____________________________________________________________________________   Name (if used)    Language/Organization   Time    _____________________________________________________________________________  Anesthesiologist Signature     Date   Time  I have discussed the procedure and information above with the patient (or patient’s representative) and answered their questions. The patient or their representative has agreed to have anesthesia services.    _____________________________________________________________________________  Witness        Date   Time  I have verified that the signature is that of the patient or patient’s representative, and that it was signed before the procedure  Patient Name: Lise Ahumada     : 3/27/1995                 Printed: 2025     Medical Record #: [id number]                     Page 2 of 2

## (undated) NOTE — LETTER
Date & Time: 3/20/2021, 2:59 AM  Patient: Fabrice Lanier  Encounter Provider(s):    Adeola Mercado MD       To Whom It May Concern:    Fabrice Lanier was seen and treated in our department on 3/20/2021. She should not return to work until 3/22/21.     If you have an